# Patient Record
Sex: FEMALE | URBAN - NONMETROPOLITAN AREA
[De-identification: names, ages, dates, MRNs, and addresses within clinical notes are randomized per-mention and may not be internally consistent; named-entity substitution may affect disease eponyms.]

---

## 2022-08-11 ENCOUNTER — APPOINTMENT (RX ONLY)
Dept: URBAN - NONMETROPOLITAN AREA CLINIC 4 | Facility: CLINIC | Age: 82
Setting detail: DERMATOLOGY
End: 2022-08-11

## 2022-08-11 DIAGNOSIS — L60.3 NAIL DYSTROPHY: ICD-10-CM

## 2022-08-11 PROCEDURE — 11730 AVULSION NAIL PLATE SIMPLE 1: CPT

## 2022-08-11 PROCEDURE — ? NAIL CLIPPING FOR PATHOLOGY

## 2022-08-11 PROCEDURE — ? COUNSELING

## 2022-08-11 PROCEDURE — ? NAIL AVULSION

## 2022-08-11 ASSESSMENT — LOCATION SIMPLE DESCRIPTION DERM: LOCATION SIMPLE: RIGHT THUMBNAIL

## 2022-08-11 ASSESSMENT — LOCATION DETAILED DESCRIPTION DERM: LOCATION DETAILED: RIGHT THUMBNAIL

## 2022-08-11 ASSESSMENT — LOCATION ZONE DERM: LOCATION ZONE: FINGERNAIL

## 2022-08-11 NOTE — PROCEDURE: NAIL AVULSION
Bill For Surgical Tray: no
Lab: 6
Consent: The rationale for nail avulsion was explained to the patient and consent was obtained. The risks, benefits and alternatives to therapy were discussed in detail. Specifically, the risks of infection, scarring, bleeding, prolonged wound healing, incomplete removal, allergy to anesthesia, nerve injury and recurrence were addressed. Prior to the procedure, the treatment site was clearly identified and confirmed by the patient. All components of Universal Protocol/PAUSE Rule completed.
Body Location Override (Optional - Billing Will Still Be Based On Selected Body Map Location If Applicable): right thumbnail
Lab Facility: 3
Avulsion Type: nail avulsion
Billing Type: Third-Party Bill
Surgical Prep Text: The patient was placed in the supine position on the operating table in the surgery suite. The area was prepared and draped in the standard manner. Digital block(s) were obtained with 1% lidocaine with epinephrine.
Detail Level: Detailed
Nail Avulsion Text: The nail was removed by undermining, removing the nail from the nail bed. The nail was subsequently avulsed.  Hemostasis was obtained with electrocautery.

## 2022-08-11 NOTE — HPI: INFECTION (PARONYCHIA)
How Severe Is It?: moderate
Is This A New Presentation, Or A Follow-Up?: Infection
Additional History: Patient was taking Amoxicillin 500 mg BID.

## 2022-08-18 ENCOUNTER — APPOINTMENT (RX ONLY)
Dept: URBAN - NONMETROPOLITAN AREA CLINIC 4 | Facility: CLINIC | Age: 82
Setting detail: DERMATOLOGY
End: 2022-08-18

## 2022-08-18 DIAGNOSIS — L60.3 NAIL DYSTROPHY: ICD-10-CM

## 2022-08-18 DIAGNOSIS — D485 NEOPLASM OF UNCERTAIN BEHAVIOR OF SKIN: ICD-10-CM

## 2022-08-18 PROBLEM — D48.5 NEOPLASM OF UNCERTAIN BEHAVIOR OF SKIN: Status: ACTIVE | Noted: 2022-08-18

## 2022-08-18 PROCEDURE — ? COUNSELING

## 2022-08-18 PROCEDURE — 99212 OFFICE O/P EST SF 10 MIN: CPT | Mod: 25

## 2022-08-18 PROCEDURE — 11301 SHAVE SKIN LESION 0.6-1.0 CM: CPT

## 2022-08-18 PROCEDURE — ? TREATMENT REGIMEN

## 2022-08-18 PROCEDURE — ? SHAVE REMOVAL

## 2022-08-18 ASSESSMENT — LOCATION ZONE DERM
LOCATION ZONE: FINGERNAIL
LOCATION ZONE: ARM

## 2022-08-18 ASSESSMENT — LOCATION SIMPLE DESCRIPTION DERM
LOCATION SIMPLE: RIGHT THUMBNAIL
LOCATION SIMPLE: LEFT FOREARM

## 2022-08-18 ASSESSMENT — LOCATION DETAILED DESCRIPTION DERM
LOCATION DETAILED: RIGHT THUMBNAIL
LOCATION DETAILED: LEFT DISTAL DORSAL FOREARM

## 2022-08-18 NOTE — PROCEDURE: SHAVE REMOVAL
Medical Necessity Information: It is in your best interest to select a reason for this procedure from the list below. All of these items fulfill various CMS LCD requirements except the new and changing color options.
Medical Necessity Clause: This procedure was medically necessary because the lesion that was treated was:
Lab: 6
Lab Facility: 3
Detail Level: Detailed
Was A Bandage Applied: Yes
Size Of Lesion In Cm (Required): 0.6
X Size Of Lesion In Cm (Optional): 0
Biopsy Method: Dermablade
Anesthesia Type: 1% lidocaine with epinephrine
Hemostasis: Drysol
Wound Care: Petrolatum
Path Notes (To The Dermatopathologist): Please check margins
Render Path Notes In Note?: No
Consent was obtained from the patient. The risks and benefits to therapy were discussed in detail. Specifically, the risks of infection, scarring, bleeding, prolonged wound healing, incomplete removal, allergy to anesthesia, nerve injury and recurrence were addressed. Prior to the procedure, the treatment site was clearly identified and confirmed by the patient. All components of Universal Protocol/PAUSE Rule completed.
Post-Care Instructions: I reviewed with the patient in detail post-care instructions. Patient is to keep the biopsy site dry overnight, and then apply bacitracin twice daily until healed. Patient may apply hydrogen peroxide soaks to remove any crusting.
Notification Instructions: Patient will be notified of pathology results. However, patient instructed to call the office if not contacted within 2 weeks.
Billing Type: Third-Party Bill

## 2022-08-25 ENCOUNTER — APPOINTMENT (RX ONLY)
Dept: URBAN - NONMETROPOLITAN AREA CLINIC 4 | Facility: CLINIC | Age: 82
Setting detail: DERMATOLOGY
End: 2022-08-25

## 2022-08-25 DIAGNOSIS — T81.3 DISRUPTION OF WOUND, NOT ELSEWHERE CLASSIFIED: ICD-10-CM

## 2022-08-25 DIAGNOSIS — T81.329 DEEP DISRUPTION OR DEHISCENCE OF OPERATION WOUND, UNSPECIFIED: ICD-10-CM

## 2022-08-25 PROBLEM — T81.31XA DISRUPTION OF EXTERNAL OPERATION (SURGICAL) WOUND, NOT ELSEWHERE CLASSIFIED, INITIAL ENCOUNTER: Status: ACTIVE | Noted: 2022-08-25

## 2022-08-25 PROCEDURE — ? TREATMENT REGIMEN

## 2022-08-25 PROCEDURE — ? COUNSELING

## 2022-08-25 PROCEDURE — ? PRESCRIPTION

## 2022-08-25 PROCEDURE — 99213 OFFICE O/P EST LOW 20 MIN: CPT

## 2022-08-25 RX ORDER — AMOXICILLIN 500 MG/1
500 CAPSULE ORAL BID
Qty: 14 | Refills: 0 | Status: ERX | COMMUNITY
Start: 2022-08-25

## 2022-08-25 RX ADMIN — AMOXICILLIN 500: 500 CAPSULE ORAL at 00:00

## 2022-08-25 ASSESSMENT — LOCATION DETAILED DESCRIPTION DERM: LOCATION DETAILED: LEFT PROXIMAL DORSAL FOREARM

## 2022-08-25 ASSESSMENT — LOCATION SIMPLE DESCRIPTION DERM: LOCATION SIMPLE: LEFT FOREARM

## 2022-08-25 ASSESSMENT — LOCATION ZONE DERM: LOCATION ZONE: ARM

## 2022-08-25 NOTE — PROCEDURE: TREATMENT REGIMEN
Initiate Treatment: Amoxicillin 500mg BID x 7 days
Detail Level: Zone
Continue Regimen: Patient will not pick or scrub or irritate her surgical site.
Samples Given: Ala-soila BID

## 2023-02-23 ENCOUNTER — APPOINTMENT (RX ONLY)
Dept: URBAN - NONMETROPOLITAN AREA CLINIC 4 | Facility: CLINIC | Age: 83
Setting detail: DERMATOLOGY
End: 2023-02-23

## 2023-02-23 DIAGNOSIS — L60.3 NAIL DYSTROPHY: ICD-10-CM

## 2023-02-23 PROCEDURE — ? COUNSELING

## 2023-02-23 PROCEDURE — ? NAIL AVULSION

## 2023-02-23 PROCEDURE — 11730 AVULSION NAIL PLATE SIMPLE 1: CPT

## 2023-02-23 ASSESSMENT — LOCATION ZONE DERM: LOCATION ZONE: FINGERNAIL

## 2023-02-23 ASSESSMENT — LOCATION SIMPLE DESCRIPTION DERM: LOCATION SIMPLE: LEFT THUMBNAIL

## 2023-02-23 ASSESSMENT — LOCATION DETAILED DESCRIPTION DERM: LOCATION DETAILED: LEFT THUMBNAIL

## 2023-02-23 NOTE — PROCEDURE: NAIL AVULSION
Surgical Prep Text: The patient was placed in the supine position on the operating table in the surgery suite. The area was prepared and draped in the standard manner. Digital block(s) were obtained with 2% lidocaine without epinephrine.
Bill For Surgical Tray: no
Nail Avulsion Text: The nail was removed by undermining, removing the nail from the nail bed. The nail was subsequently avulsed.  Hemostasis was obtained with electrocautery.
Billing Type: Third-Party Bill
Partial Avulsion Text: The nail was partially removed by undermining, removing the nail from the nail bed. The nail was subsequently avulsed.  Hemostasis was obtained with electrocautery.
Lab Facility: 3
Consent: The rationale for nail avulsion was explained to the patient and consent was obtained. The risks, benefits and alternatives to therapy were discussed in detail. Specifically, the risks of infection, scarring, bleeding, prolonged wound healing, incomplete removal, allergy to anesthesia, nerve injury and recurrence were addressed. Prior to the procedure, the treatment site was clearly identified and confirmed by the patient. All components of Universal Protocol/PAUSE Rule completed.
Detail Level: Detailed
Avulsion Type: nail avulsion
Lab: 6
Nail And Matrix Avulsion Text: The nail was removed by undermining, removing the nail from the nail bed. The nail was subsequently avulsed.  The matrix was then removed with a 15 blade scalpel. Hemostasis was obtained with electrocautery.

## 2023-04-25 ENCOUNTER — TELEPHONE (OUTPATIENT)
Dept: UROLOGY | Facility: AMBULATORY SURGERY CENTER | Age: 83
End: 2023-04-25

## 2023-04-25 NOTE — TELEPHONE ENCOUNTER
New Patient    What is the reason for the patient’s appointment?: establish care  Pt was seen in [place]  She had two botox treatments  She states that she has leakage       What office location does the patient prefer?: [facility] [place]     Does patient have Imaging/Lab Results: no    Have patient records been requested?: yes   If No, are the records showing in Epic:       INSURANCE:  Do we accept the patient's insurance or is the patient Self-Pay?: yes    Insurance Provider: medicare   Plan Type/Number:  Member ID#:       HISTORY:   Has the patient had any previous Urologist(s)?: yes Christine Dudley    Was the patient seen in the ED?: no    Has the patient had any outside testing done?: yes     Does the patient have a personal history of cancer?: no

## 2023-05-05 ENCOUNTER — OFFICE VISIT (OUTPATIENT)
Dept: UROLOGY | Facility: CLINIC | Age: 83
End: 2023-05-05

## 2023-05-05 VITALS
HEIGHT: 62 IN | WEIGHT: 180 LBS | BODY MASS INDEX: 33.13 KG/M2 | SYSTOLIC BLOOD PRESSURE: 130 MMHG | DIASTOLIC BLOOD PRESSURE: 80 MMHG

## 2023-05-05 DIAGNOSIS — R32 URINARY INCONTINENCE, UNSPECIFIED TYPE: Primary | ICD-10-CM

## 2023-05-05 LAB

## 2023-05-05 RX ORDER — LISINOPRIL 40 MG/1
1 TABLET ORAL DAILY
COMMUNITY
Start: 2023-02-16

## 2023-05-05 RX ORDER — OMEPRAZOLE 20 MG/1
20 CAPSULE, DELAYED RELEASE ORAL DAILY
COMMUNITY
Start: 2023-02-20

## 2023-05-05 RX ORDER — ESCITALOPRAM OXALATE 5 MG/1
5 TABLET ORAL DAILY
COMMUNITY
Start: 2023-04-05

## 2023-05-05 RX ORDER — PRAMIPEXOLE DIHYDROCHLORIDE 0.25 MG/1
TABLET ORAL
COMMUNITY
Start: 2023-04-10

## 2023-05-05 RX ORDER — VIBEGRON 75 MG/1
1 TABLET, FILM COATED ORAL DAILY
COMMUNITY
Start: 2023-03-31 | End: 2023-05-05 | Stop reason: ALTCHOICE
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
## 2023-05-17 ENCOUNTER — PROCEDURE VISIT (OUTPATIENT)
Dept: UROLOGY | Facility: CLINIC | Age: 83
End: 2023-05-17

## 2023-05-17 VITALS
WEIGHT: 180 LBS | HEART RATE: 82 BPM | DIASTOLIC BLOOD PRESSURE: 70 MMHG | OXYGEN SATURATION: 87 % | HEIGHT: 62 IN | SYSTOLIC BLOOD PRESSURE: 130 MMHG | BODY MASS INDEX: 33.13 KG/M2

## 2023-05-17 DIAGNOSIS — N30.80 CYSTITIS CYSTICA: ICD-10-CM

## 2023-05-17 DIAGNOSIS — N30.00 ACUTE CYSTITIS WITHOUT HEMATURIA: ICD-10-CM

## 2023-05-17 DIAGNOSIS — R32 URINARY INCONTINENCE, UNSPECIFIED TYPE: Primary | ICD-10-CM

## 2023-05-17 DIAGNOSIS — N32.81 OVERACTIVE BLADDER: ICD-10-CM

## 2023-05-17 LAB
SL AMB  POCT GLUCOSE, UA: NORMAL
SL AMB LEUKOCYTE ESTERASE,UA: NORMAL
SL AMB POCT BILIRUBIN,UA: NORMAL
SL AMB POCT BLOOD,UA: NORMAL
SL AMB POCT CLARITY,UA: CLEAR
SL AMB POCT COLOR,UA: YELLOW
SL AMB POCT KETONES,UA: NORMAL
SL AMB POCT NITRITE,UA: NORMAL
SL AMB POCT PH,UA: 7
SL AMB POCT SPECIFIC GRAVITY,UA: 1
SL AMB POCT URINE PROTEIN: NORMAL
SL AMB POCT UROBILINOGEN: 0.2

## 2023-05-17 RX ORDER — CEPHALEXIN 500 MG/1
500 CAPSULE ORAL EVERY 6 HOURS SCHEDULED
Qty: 28 CAPSULE | Refills: 0 | Status: SHIPPED | OUTPATIENT
Start: 2023-05-17 | End: 2023-05-24

## 2023-05-17 RX ORDER — CEPHALEXIN 500 MG/1
500 CAPSULE ORAL ONCE
Qty: 1 CAPSULE | Refills: 0 | Status: SHIPPED | OUTPATIENT
Start: 2023-05-17 | End: 2023-05-17

## 2023-05-17 NOTE — LETTER
May 17, 2023     Mark Santiago MD  [address] [address]    Patient: Magy Harden   YOB: 1940   Date of Visit: 2023       Dear Dr Franklyn Amin: Thank you for referring Porter Rice to me for evaluation  Below are my notes for this consultation  If you have questions, please do not hesitate to call me  I look forward to following your patient along with you  Sincerely,        Saranya Funes MD        CC: No Recipients  Saranya Funes MD  2023  2:14 PM  Sign when Signing Visit  [address] [facility]  [address], [address] [place] [phone]  www  [email or web address]      NAME: Lisa Wolfe  AGE: 78 y o  SEX: female  : 1940   MRN: [id number]    DATE: 2023  TIME: 2:10 PM    Assessment and Plan:    Refractory urge incontinence and overactive bladder, spondee to Botox but failed medications  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 and she may have an active UTI  Urine culture was sent and I will give her a full course of antibiotics to see if we can clear this up-this will be Keflex 500 mg p o  4 times daily for 7 days  It is possible that her symptoms may resolve with clearing of the UTI  If she still has a symptoms after the urinary tract infection is cleared, we will set her up for Botox upon her request                Chief Complaint   No chief complaint on file  History of Present Illness   56-year-old woman, established patient but new to me-seen as a new patient by Mr Александр Han May 5, 2023 for overactive bladder with urge incontinence  She has failed multiple oral therapies to include Sanctura, Ditropan, Myrbetriq and more recently [place]  She had been undergoing Botox injections with the last injection 2022 by Dr Stef Farrar with [facility]  She was set up for cystoscopy for evaluation and consideration for additional Botox therapy    She has history of right renal cell carcinoma with history of right nephrectomy  No recent upper tract imaging  The Botox was working for her, and the last injection was perhaps August or September of last year  Cystoscopy     Date/Time 5/17/2023 1:30 PM     Performed by  Yasmany Medrano MD     Authorized by BRITTANY Begum      Universal Protocol:  Consent: Verbal consent obtained  Written consent obtained  Procedure Details:  Procedure type: cystoscopy    Additional Procedure Details: Cystoscopy Procedure Note        Pre-operative Diagnosis: Refractory overactive bladder    Post-operative Diagnosis: Same, cystitis cystica with possible active UTI    Procedure: Flexible cystoscopy    Surgeon: Edson Goodell MD    Anesthesia: 1% Xylocaine per urethra    EBL: Minimal    Complications: none    Procedure Details   The risks, benefits, complications, treatment options, and expected outcomes were discussed with the patient  The patient concurred with the proposed plan, giving informed consent  Cystoscopy was performed today under local anesthesia, using sterile technique  The patient was placed in the supine position, prepped with Betadine, and draped in the usual sterile fashion  The flexible cystocope was used to inspect both the urethra and bladder    Findings:  Urethra: Normal without stricture  Introitus normal for age urethra somewhat retracted  Bladder: Trabeculation, cloudy urine and cystitis cystica     The orifices were orthotopic and intact  60 cc of urine was removed with a syringe and sent for culture  Specimens: Urine culture                 Complications:  None           Disposition: To home            Condition:  Stable          The following portions of the patient's history were reviewed and updated as appropriate: allergies, current medications, past family history, past medical history, past social history, past surgical history and problem list   History reviewed   No "pertinent past medical history  History reviewed  No pertinent surgical history  shoulder  Review of Systems   Review of Systems   Constitutional: Negative for fever  Respiratory: Negative for shortness of breath  Cardiovascular: Negative for chest pain  Genitourinary: Positive for frequency  As per HPI       Active Problem List   There is no problem list on file for this patient  Objective   /70   Pulse 82   Ht 5' 2\" (1 575 m)   Wt 81 6 kg (180 lb)   SpO2 (!) 87%   BMI 32 92 kg/m²     Physical Exam  Vitals reviewed  Constitutional:       Appearance: Normal appearance  HENT:      Head: Normocephalic and atraumatic  Eyes:      Extraocular Movements: Extraocular movements intact  Pulmonary:      Effort: Pulmonary effort is normal    Genitourinary:     General: Normal vulva  Vagina: No vaginal discharge  Musculoskeletal:         General: Normal range of motion  Cervical back: Normal range of motion  Skin:     Coloration: Skin is not jaundiced or pale  Neurological:      General: No focal deficit present  Mental Status: She is alert and oriented to person, place, and time  Psychiatric:         Mood and Affect: Mood normal          Behavior: Behavior normal          Thought Content:  Thought content normal          Judgment: Judgment normal              Current Medications     Current Outpatient Medications:   •  cephalexin (KEFLEX) 500 mg capsule, Take 1 capsule (500 mg total) by mouth 1 (one) time for 1 dose Take after procedure, Disp: 1 capsule, Rfl: 0  •  escitalopram (LEXAPRO) 5 mg tablet, Take 5 mg by mouth daily, Disp: , Rfl:   •  lisinopril (ZESTRIL) 40 mg tablet, Take 1 tablet by mouth daily, Disp: , Rfl:   •  omeprazole (PriLOSEC) 20 mg delayed release capsule, Take 20 mg by mouth daily, Disp: , Rfl:   •  pramipexole (MIRAPEX) 0 25 mg tablet, Take 1 tablet (0 25 mg total) by mouth nightly , Disp: , Rfl:         Jules Elizabeth MD          "

## 2023-05-17 NOTE — PROGRESS NOTES
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 and she may have an active UTI  Urine culture was sent and I will give her a full course of antibiotics to see if we can clear this up-this will be Keflex 500 mg p o  4 times daily for 7 days  It is possible that her symptoms may resolve with clearing of the UTI  If she still has a symptoms after the urinary tract infection is cleared, we will set her up for Botox upon her request                Chief Complaint   No chief complaint on file  History of Present Illness   26-year-old woman, established patient but new to me-seen as a new patient by Mr Chapito Wasserman May 5, 2023 for overactive bladder with urge incontinence  She has failed multiple oral therapies to include Sanctura, Ditropan, Myrbetriq and more recently [place]  She had been undergoing Botox injections with the last injection 2022 by Dr Hilda Carrasco with [facility]  She was set up for cystoscopy for evaluation and consideration for additional Botox therapy  She has history of right renal cell carcinoma with history of right nephrectomy  No recent upper tract imaging  The Botox was working for her, and the last injection was perhaps August or September of last year  Cystoscopy     Date/Time 2023 1:30 PM     Performed by  Sherwin Cook MD     Authorized by BRITTANY Quesada      Universal Protocol:  Consent: Verbal consent obtained  Written consent obtained        Procedure Details:  Procedure type: cystoscopy    Additional Procedure Details: Cystoscopy Procedure "Note        Pre-operative Diagnosis: Refractory overactive bladder    Post-operative Diagnosis: Same, cystitis cystica with possible active UTI    Procedure: Flexible cystoscopy    Surgeon: Maggie Rizzo MD    Anesthesia: 1% Xylocaine per urethra    EBL: Minimal    Complications: none    Procedure Details   The risks, benefits, complications, treatment options, and expected outcomes were discussed with the patient  The patient concurred with the proposed plan, giving informed consent  Cystoscopy was performed today under local anesthesia, using sterile technique  The patient was placed in the supine position, prepped with Betadine, and draped in the usual sterile fashion  The flexible cystocope was used to inspect both the urethra and bladder    Findings:  Urethra: Normal without stricture  Introitus normal for age urethra somewhat retracted  Bladder: Trabeculation, cloudy urine and cystitis cystica     The orifices were orthotopic and intact  60 cc of urine was removed with a syringe and sent for culture  Specimens: Urine culture                 Complications:  None           Disposition: To home            Condition:  Stable          The following portions of the patient's history were reviewed and updated as appropriate: allergies, current medications, past family history, past medical history, past social history, past surgical history and problem list   History reviewed  No pertinent past medical history  History reviewed  No pertinent surgical history  shoulder  Review of Systems   Review of Systems   Constitutional: Negative for fever  Respiratory: Negative for shortness of breath  Cardiovascular: Negative for chest pain  Genitourinary: Positive for frequency  As per HPI       Active Problem List   There is no problem list on file for this patient        Objective   /70   Pulse 82   Ht 5' 2\" (1 575 m)   Wt 81 6 kg (180 lb)   SpO2 (!) 87%   BMI 32 92 kg/m² " Physical Exam  Vitals reviewed  Constitutional:       Appearance: Normal appearance  HENT:      Head: Normocephalic and atraumatic  Eyes:      Extraocular Movements: Extraocular movements intact  Pulmonary:      Effort: Pulmonary effort is normal    Genitourinary:     General: Normal vulva  Vagina: No vaginal discharge  Musculoskeletal:         General: Normal range of motion  Cervical back: Normal range of motion  Skin:     Coloration: Skin is not jaundiced or pale  Neurological:      General: No focal deficit present  Mental Status: She is alert and oriented to person, place, and time  Psychiatric:         Mood and Affect: Mood normal          Behavior: Behavior normal          Thought Content:  Thought content normal          Judgment: Judgment normal              Current Medications     Current Outpatient Medications:   •  cephalexin (KEFLEX) 500 mg capsule, Take 1 capsule (500 mg total) by mouth 1 (one) time for 1 dose Take after procedure, Disp: 1 capsule, Rfl: 0  •  escitalopram (LEXAPRO) 5 mg tablet, Take 5 mg by mouth daily, Disp: , Rfl:   •  lisinopril (ZESTRIL) 40 mg tablet, Take 1 tablet by mouth daily, Disp: , Rfl:   •  omeprazole (PriLOSEC) 20 mg delayed release capsule, Take 20 mg by mouth daily, Disp: , Rfl:   •  pramipexole (MIRAPEX) 0 25 mg tablet, Take 1 tablet (0 25 mg total) by mouth nightly , Disp: , Rfl:         Yasmany Medrano MD

## 2023-05-17 NOTE — PATIENT INSTRUCTIONS
You have just undergone cystoscopy of the bladder  Expect to see some blood in the urine, which should clear up within 24 to 48 hours  You also may experience burning urgency and frequency of urination which is normal   Call for fever greater than 101 5 degrees, severe pain not relieved by over-the-counter medicines, or inability to urinate  You may resume all normal activities  For irritative symptoms, you can purchase over-the-counter remedies such as cystek or Azo, or any other preparations advertised in the pharmacy for bladder symptoms  I will call in Pyridium which is a prescription strength medication for bladder irritability upon your request   I sent in a full prescription for Keflex to be taken for 7 days  Urine culture was sent and we will see what this shows  If Keflex is not the right antibiotic we will send in another prescription    Let me know if you are no better after being treated for urinary tract infection and we will set you up for Botox upon your request

## 2023-05-18 ENCOUNTER — APPOINTMENT (RX ONLY)
Dept: URBAN - NONMETROPOLITAN AREA CLINIC 4 | Facility: CLINIC | Age: 83
Setting detail: DERMATOLOGY
End: 2023-05-18

## 2023-05-18 DIAGNOSIS — B35.1 TINEA UNGUIUM: ICD-10-CM | Status: WELL CONTROLLED

## 2023-05-18 PROCEDURE — ? TREATMENT REGIMEN

## 2023-05-18 PROCEDURE — ? COUNSELING

## 2023-05-18 PROCEDURE — ? PHOTO-DOCUMENTATION

## 2023-05-18 PROCEDURE — ? PRESCRIPTION

## 2023-05-18 PROCEDURE — 99213 OFFICE O/P EST LOW 20 MIN: CPT

## 2023-05-18 RX ORDER — CLOTRIMAZOLE AND BETAMETHASONE DIPROPIONATE 10; .5 MG/G; MG/G
1-0.05% CREAM TOPICAL QD
Qty: 1 | Refills: 3 | Status: ERX | COMMUNITY
Start: 2023-05-18

## 2023-05-18 RX ADMIN — CLOTRIMAZOLE AND BETAMETHASONE DIPROPIONATE 1-0.05%: 10; .5 CREAM TOPICAL at 00:00

## 2023-05-18 ASSESSMENT — LOCATION SIMPLE DESCRIPTION DERM
LOCATION SIMPLE: RIGHT THUMBNAIL
LOCATION SIMPLE: RIGHT THUMB

## 2023-05-18 ASSESSMENT — LOCATION DETAILED DESCRIPTION DERM
LOCATION DETAILED: PERIUNGUAL SKIN RIGHT THUMB
LOCATION DETAILED: RIGHT THUMBNAIL

## 2023-05-18 ASSESSMENT — LOCATION ZONE DERM
LOCATION ZONE: FINGERNAIL
LOCATION ZONE: FINGER

## 2023-05-19 LAB — BACTERIA UR CULT: ABNORMAL

## 2023-06-02 ENCOUNTER — TELEPHONE (OUTPATIENT)
Dept: OTHER | Facility: OTHER | Age: 83
End: 2023-06-02

## 2023-06-02 NOTE — TELEPHONE ENCOUNTER
Called and spoke with Lory Owen  Advised that Keflex was the appropriate antibiotic for her most recent urinary tract infection and she did complete the full course  Patient continues with OAB and urinary frequency as before  She would like to proceed with Botox as discussed with Dr Berhane Brown  Advised message would be sent to the provider to place case request, then patient should expect a call from Robin Rizzo 148 7-10 business days after case request is placed to schedule procedure  She verbalized understanding

## 2023-06-02 NOTE — TELEPHONE ENCOUNTER
Pt calling stating she was expecting a call from Dr Rodolfo Meyers this week regarding urine culture results  She is wondering whether or not she is going to need botox  Please follow up with her regarding this

## 2023-06-05 ENCOUNTER — PREP FOR PROCEDURE (OUTPATIENT)
Dept: UROLOGY | Facility: CLINIC | Age: 83
End: 2023-06-05

## 2023-06-05 DIAGNOSIS — Z01.818 ENCOUNTER FOR PREADMISSION TESTING: ICD-10-CM

## 2023-06-05 DIAGNOSIS — R39.89 SUSPECTED UTI: Primary | ICD-10-CM

## 2023-06-05 DIAGNOSIS — N39.498 OTHER URINARY INCONTINENCE: ICD-10-CM

## 2023-06-05 DIAGNOSIS — N32.81 OVERACTIVE BLADDER: ICD-10-CM

## 2023-06-09 ENCOUNTER — TELEPHONE (OUTPATIENT)
Dept: UROLOGY | Facility: CLINIC | Age: 83
End: 2023-06-09

## 2023-06-09 NOTE — TELEPHONE ENCOUNTER
Spoke to pt, scheduled Botox for next available at [facility] for 7/26   Mailed all instructions
Launch [employer] and print the 'Prescriptions from this Visit' Report

## 2023-07-21 RX ORDER — DIPHENOXYLATE HYDROCHLORIDE AND ATROPINE SULFATE 2.5; .025 MG/1; MG/1
1 TABLET ORAL DAILY
COMMUNITY

## 2023-07-21 RX ORDER — CLOTRIMAZOLE AND BETAMETHASONE DIPROPIONATE 10; .64 MG/G; MG/G
CREAM TOPICAL
COMMUNITY
Start: 2023-05-18

## 2023-07-21 NOTE — PRE-PROCEDURE INSTRUCTIONS
Pre-Surgery Instructions:   Medication Instructions   • Ascorbic Acid (VITAMIN C PO) Stop taking 4 days prior to surgery. • clotrimazole-betamethasone (LOTRISONE) 1-0.05 % cream Hold day of surgery. • escitalopram (LEXAPRO) 5 mg tablet Take day of surgery. • lisinopril (ZESTRIL) 40 mg tablet Hold day of surgery. • multivitamin (THERAGRAN) TABS Stop taking 4 days prior to surgery. • omeprazole (PriLOSEC) 20 mg delayed release capsule Hold day of surgery. • pramipexole (MIRAPEX) 0.25 mg tablet Take night before surgery   Medication instructions for day surgery reviewed. Please use only a sip of water to take your instructed medications. Avoid all over the counter vitamins, supplements and NSAIDS for one week prior to surgery per anesthesia guidelines. Tylenol is ok to take as needed. You will receive a call one business day prior to surgery with an arrival time and hospital directions. If your surgery is scheduled on a Monday, the hospital will be calling you on the Friday prior to your surgery. If you have not heard from anyone by 8pm, please call the hospital supervisor through the hospital  at [phone]. Olman Jesús [phone]). Do not eat or drink anything after midnight the night before your surgery, including candy, mints, lifesavers, or chewing gum. Do not drink alcohol 24hrs before your surgery. Try not to smoke at least 24hrs before your surgery. Follow the pre surgery showering instructions as listed in the [facility] Surgical Experience Booklet” or otherwise provided by your surgeon's office. Do not shave the surgical area 24 hours before surgery. Do not apply any lotions, creams, including makeup, cologne, deodorant, or perfumes after showering on the day of your surgery. No contact lenses, eye make-up, or artificial eyelashes. Remove nail polish, including gel polish, and any artificial, gel, or acrylic nails if possible.  Remove all jewelry including rings and body piercing jewelry. Wear causal clothing that is easy to take on and off. Consider your type of surgery. Keep any valuables, jewelry, piercings at home. Please bring any specially ordered equipment (sling, braces) if indicated. Arrange for a responsible person to drive you to and from the hospital on the day of your surgery. Visitor Guidelines discussed. Call the surgeon's office with any new illnesses, exposures, or additional questions prior to surgery. Please reference your [facility] Surgical Experience Booklet” for additional information to prepare for your upcoming surgery.

## 2023-07-24 ENCOUNTER — OFFICE VISIT (OUTPATIENT)
Dept: LAB | Facility: HOSPITAL | Age: 83
End: 2023-07-24
Payer: MEDICARE

## 2023-07-24 ENCOUNTER — APPOINTMENT (OUTPATIENT)
Dept: LAB | Facility: HOSPITAL | Age: 83
End: 2023-07-24
Payer: MEDICARE

## 2023-07-24 DIAGNOSIS — Z01.818 ENCOUNTER FOR PREADMISSION TESTING: ICD-10-CM

## 2023-07-24 DIAGNOSIS — R39.89 SUSPECTED UTI: ICD-10-CM

## 2023-07-24 LAB
ANION GAP SERPL CALCULATED.3IONS-SCNC: 8 MMOL/L
BASOPHILS # BLD AUTO: 0.07 THOUSANDS/ÂΜL (ref 0–0.1)
BASOPHILS NFR BLD AUTO: 1 % (ref 0–1)
BUN SERPL-MCNC: 17 MG/DL (ref 5–25)
CALCIUM SERPL-MCNC: 9.6 MG/DL (ref 8.4–10.2)
CHLORIDE SERPL-SCNC: 102 MMOL/L (ref 96–108)
CO2 SERPL-SCNC: 26 MMOL/L (ref 21–32)
CREAT SERPL-MCNC: 0.73 MG/DL (ref 0.6–1.3)
EOSINOPHIL # BLD AUTO: 0.21 THOUSAND/ÂΜL (ref 0–0.61)
EOSINOPHIL NFR BLD AUTO: 3 % (ref 0–6)
ERYTHROCYTE [DISTWIDTH] IN BLOOD BY AUTOMATED COUNT: 13.2 % (ref 11.6–15.1)
GFR SERPL CREATININE-BSD FRML MDRD: 75 ML/MIN/1.73SQ M
GLUCOSE SERPL-MCNC: 115 MG/DL (ref 65–140)
HCT VFR BLD AUTO: 48 % (ref 34.8–46.1)
HGB BLD-MCNC: 15.1 G/DL (ref 11.5–15.4)
IMM GRANULOCYTES # BLD AUTO: 0.01 THOUSAND/UL (ref 0–0.2)
IMM GRANULOCYTES NFR BLD AUTO: 0 % (ref 0–2)
LYMPHOCYTES # BLD AUTO: 2.07 THOUSANDS/ÂΜL (ref 0.6–4.47)
LYMPHOCYTES NFR BLD AUTO: 26 % (ref 14–44)
MCH RBC QN AUTO: 31.2 PG (ref 26.8–34.3)
MCHC RBC AUTO-ENTMCNC: 31.5 G/DL (ref 31.4–37.4)
MCV RBC AUTO: 99 FL (ref 82–98)
MONOCYTES # BLD AUTO: 0.47 THOUSAND/ÂΜL (ref 0.17–1.22)
MONOCYTES NFR BLD AUTO: 6 % (ref 4–12)
NEUTROPHILS # BLD AUTO: 5.03 THOUSANDS/ÂΜL (ref 1.85–7.62)
NEUTS SEG NFR BLD AUTO: 64 % (ref 43–75)
NRBC BLD AUTO-RTO: 0 /100 WBCS
PLATELET # BLD AUTO: 201 THOUSANDS/UL (ref 149–390)
PMV BLD AUTO: 11.2 FL (ref 8.9–12.7)
POTASSIUM SERPL-SCNC: 4.8 MMOL/L (ref 3.5–5.3)
RBC # BLD AUTO: 4.84 MILLION/UL (ref 3.81–5.12)
SODIUM SERPL-SCNC: 136 MMOL/L (ref 135–147)
WBC # BLD AUTO: 7.86 THOUSAND/UL (ref 4.31–10.16)

## 2023-07-24 PROCEDURE — 85025 COMPLETE CBC W/AUTO DIFF WBC: CPT

## 2023-07-24 PROCEDURE — 87086 URINE CULTURE/COLONY COUNT: CPT

## 2023-07-24 PROCEDURE — 80048 BASIC METABOLIC PNL TOTAL CA: CPT

## 2023-07-24 PROCEDURE — 36415 COLL VENOUS BLD VENIPUNCTURE: CPT

## 2023-07-24 PROCEDURE — 93005 ELECTROCARDIOGRAM TRACING: CPT

## 2023-07-25 LAB
ATRIAL RATE: 67 BPM
BACTERIA UR CULT: NORMAL
P AXIS: 73 DEGREES
PR INTERVAL: 188 MS
QRS AXIS: 69 DEGREES
QRSD INTERVAL: 82 MS
QT INTERVAL: 426 MS
QTC INTERVAL: 450 MS
T WAVE AXIS: 58 DEGREES
VENTRICULAR RATE: 67 BPM

## 2023-07-25 PROCEDURE — 93010 ELECTROCARDIOGRAM REPORT: CPT | Performed by: INTERNAL MEDICINE

## 2023-07-26 ENCOUNTER — ANESTHESIA EVENT (OUTPATIENT)
Dept: PERIOP | Facility: HOSPITAL | Age: 83
End: 2023-07-26
Payer: MEDICARE

## 2023-07-26 ENCOUNTER — HOSPITAL ENCOUNTER (OUTPATIENT)
Facility: HOSPITAL | Age: 83
Setting detail: OUTPATIENT SURGERY
Discharge: HOME | End: 2023-07-26
Attending: UROLOGY | Admitting: UROLOGY
Payer: MEDICARE

## 2023-07-26 ENCOUNTER — ANESTHESIA (OUTPATIENT)
Dept: PERIOP | Facility: HOSPITAL | Age: 83
End: 2023-07-26
Payer: MEDICARE

## 2023-07-26 VITALS
TEMPERATURE: 97 F | WEIGHT: 180 LBS | BODY MASS INDEX: 33.13 KG/M2 | HEART RATE: 76 BPM | SYSTOLIC BLOOD PRESSURE: 182 MMHG | OXYGEN SATURATION: 94 % | RESPIRATION RATE: 20 BRPM | HEIGHT: 62 IN | DIASTOLIC BLOOD PRESSURE: 73 MMHG

## 2023-07-26 PROBLEM — K21.9 GASTROESOPHAGEAL REFLUX DISEASE: Status: ACTIVE | Noted: 2023-07-26

## 2023-07-26 PROBLEM — I10 HTN (HYPERTENSION): Status: ACTIVE | Noted: 2023-07-26

## 2023-07-26 PROBLEM — I34.0 MITRAL REGURGITATION: Status: ACTIVE | Noted: 2023-07-26

## 2023-07-26 PROCEDURE — 52287 CYSTOSCOPY CHEMODENERVATION: CPT | Performed by: UROLOGY

## 2023-07-26 PROCEDURE — NC001 PR NO CHARGE: Performed by: UROLOGY

## 2023-07-26 RX ORDER — PROPOFOL 10 MG/ML
INJECTION, EMULSION INTRAVENOUS AS NEEDED
Status: DISCONTINUED | OUTPATIENT
Start: 2023-07-26 | End: 2023-07-26

## 2023-07-26 RX ORDER — PROPOFOL 10 MG/ML
INJECTION, EMULSION INTRAVENOUS CONTINUOUS PRN
Status: DISCONTINUED | OUTPATIENT
Start: 2023-07-26 | End: 2023-07-26

## 2023-07-26 RX ORDER — PHENAZOPYRIDINE HYDROCHLORIDE 100 MG/1
200 TABLET, FILM COATED ORAL ONCE
Status: COMPLETED | OUTPATIENT
Start: 2023-07-26 | End: 2023-07-26

## 2023-07-26 RX ORDER — HYDROMORPHONE HCL/PF 1 MG/ML
0.4 SYRINGE (ML) INJECTION
Status: DISCONTINUED | OUTPATIENT
Start: 2023-07-26 | End: 2023-07-26 | Stop reason: HOSPADM

## 2023-07-26 RX ORDER — MAGNESIUM HYDROXIDE 1200 MG/15ML
LIQUID ORAL AS NEEDED
Status: DISCONTINUED | OUTPATIENT
Start: 2023-07-26 | End: 2023-07-26 | Stop reason: HOSPADM

## 2023-07-26 RX ORDER — FENTANYL CITRATE/PF 50 MCG/ML
50 SYRINGE (ML) INJECTION
Status: DISCONTINUED | OUTPATIENT
Start: 2023-07-26 | End: 2023-07-26 | Stop reason: HOSPADM

## 2023-07-26 RX ORDER — FENTANYL CITRATE 50 UG/ML
INJECTION, SOLUTION INTRAMUSCULAR; INTRAVENOUS AS NEEDED
Status: DISCONTINUED | OUTPATIENT
Start: 2023-07-26 | End: 2023-07-26

## 2023-07-26 RX ORDER — SODIUM CHLORIDE, SODIUM LACTATE, POTASSIUM CHLORIDE, CALCIUM CHLORIDE 600; 310; 30; 20 MG/100ML; MG/100ML; MG/100ML; MG/100ML
125 INJECTION, SOLUTION INTRAVENOUS CONTINUOUS
Status: DISCONTINUED | OUTPATIENT
Start: 2023-07-26 | End: 2023-07-26 | Stop reason: HOSPADM

## 2023-07-26 RX ORDER — ONDANSETRON 2 MG/ML
INJECTION INTRAMUSCULAR; INTRAVENOUS AS NEEDED
Status: DISCONTINUED | OUTPATIENT
Start: 2023-07-26 | End: 2023-07-26

## 2023-07-26 RX ORDER — LIDOCAINE HYDROCHLORIDE 10 MG/ML
INJECTION, SOLUTION EPIDURAL; INFILTRATION; INTRACAUDAL; PERINEURAL AS NEEDED
Status: DISCONTINUED | OUTPATIENT
Start: 2023-07-26 | End: 2023-07-26

## 2023-07-26 RX ORDER — CEFAZOLIN SODIUM 2 G/50ML
2000 SOLUTION INTRAVENOUS ONCE
Status: COMPLETED | OUTPATIENT
Start: 2023-07-26 | End: 2023-07-26

## 2023-07-26 RX ORDER — ONDANSETRON 2 MG/ML
4 INJECTION INTRAMUSCULAR; INTRAVENOUS ONCE AS NEEDED
Status: DISCONTINUED | OUTPATIENT
Start: 2023-07-26 | End: 2023-07-26 | Stop reason: HOSPADM

## 2023-07-26 RX ORDER — PROMETHAZINE HYDROCHLORIDE 25 MG/ML
25 INJECTION, SOLUTION INTRAMUSCULAR; INTRAVENOUS ONCE AS NEEDED
Status: DISCONTINUED | OUTPATIENT
Start: 2023-07-26 | End: 2023-07-26 | Stop reason: HOSPADM

## 2023-07-26 RX ADMIN — PROPOFOL 20 MG: 10 INJECTION, EMULSION INTRAVENOUS at 10:52

## 2023-07-26 RX ADMIN — PROPOFOL 20 MG: 10 INJECTION, EMULSION INTRAVENOUS at 11:00

## 2023-07-26 RX ADMIN — ONDANSETRON 4 MG: 2 INJECTION INTRAMUSCULAR; INTRAVENOUS at 11:03

## 2023-07-26 RX ADMIN — LIDOCAINE HYDROCHLORIDE 50 MG: 10 INJECTION, SOLUTION EPIDURAL; INFILTRATION; INTRACAUDAL; PERINEURAL at 10:49

## 2023-07-26 RX ADMIN — FENTANYL CITRATE 25 MCG: 50 INJECTION, SOLUTION INTRAMUSCULAR; INTRAVENOUS at 10:53

## 2023-07-26 RX ADMIN — PROPOFOL 20 MG: 10 INJECTION, EMULSION INTRAVENOUS at 10:49

## 2023-07-26 RX ADMIN — CEFAZOLIN SODIUM 2000 MG: 2 SOLUTION INTRAVENOUS at 10:45

## 2023-07-26 RX ADMIN — PROPOFOL 60 MCG/KG/MIN: 10 INJECTION, EMULSION INTRAVENOUS at 10:49

## 2023-07-26 RX ADMIN — FENTANYL CITRATE 25 MCG: 50 INJECTION, SOLUTION INTRAMUSCULAR; INTRAVENOUS at 11:00

## 2023-07-26 RX ADMIN — SODIUM CHLORIDE, SODIUM LACTATE, POTASSIUM CHLORIDE, AND CALCIUM CHLORIDE: .6; .31; .03; .02 INJECTION, SOLUTION INTRAVENOUS at 10:14

## 2023-07-26 RX ADMIN — PHENAZOPYRIDINE 200 MG: 100 TABLET ORAL at 11:31

## 2023-07-26 NOTE — ANESTHESIA POSTPROCEDURE EVALUATION
Post-Op Assessment Note    CV Status:  Stable  Pain Score: 0    Pain management: adequate  Multimodal analgesia used between 6 hours prior to anesthesia start to PACU discharge    Mental Status:  Alert and awake   Hydration Status:  Euvolemic   PONV Controlled:  Controlled   Airway Patency:  Patent      Post Op Vitals Reviewed: Yes      Staff: Anesthesiologist, CRNA         No notable events documented.     /67 (07/26/23 1117)    Temp (!) 97 °F (36.1 °C) (07/26/23 1117)    Pulse 70 (07/26/23 1117)   Resp 18 (07/26/23 1117)    SpO2 96 % (07/26/23 1117)

## 2023-07-26 NOTE — DISCHARGE INSTR - AVS FIRST PAGE
Expect to see blood in the urine, and to experience urgency/frequency/burning with urination and dribbling. This is normal after urological procedures. Is also normal to experience some nausea after these procedures. Go back your regular diet carefully. Call for fever greater that 101.5, inability to urinate, prolonged nausea and vomiting, or severe pain not relieved by pain medications. .    No driving/operating machinery for 24 hours, and while taking narcotics. Take over the counter remedy of choice to avoid constipation. Drink plenty of fluids.

## 2023-07-26 NOTE — H&P
Saeid Woodson MD  Physician  Specialty:  Urology  Progress Notes      Signed  Encounter Date:  2023  Procedure Orders   Cystoscopy [[id number]] ordered by BRITTANY Harris          Signed        Expand All Collapse All    [address] [facility]  [address] [address]  [phone]  [email or web address]        NAME: Lisa Wolfe  AGE: 78 y.o. SEX: female  : 1940            MRN: [id number]     DATE: 2023  TIME: 2:10 PM history and physical-CVS and lungs updated, no changes, plan cystoscopy with Botox 100 international units injected into the bladder. The risks of bleeding infection and urinary retention and botulism explained and she gives informed consent.     Assessment and Plan:     Refractory urge incontinence and overactive bladder, spondee to Botox but failed medications. 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 Urine culture was sent and I will give her a full course of antibiotics to see if we can clear this up-this will be Keflex 500 mg p.o. 4 times daily for 7 days. It is possible that her symptoms may resolve with clearing of the UTI. If she still has a symptoms after the urinary tract infection is cleared, we will set her up for Botox upon her request.                     Chief Complaint   No chief complaint on file.        History of Present Illness   78year-old woman, established patient but new to me-seen as a new patient by Mr. Santos Check May 5, 2023 for overactive bladder with urge incontinence. She has failed multiple oral therapies to include Sanctura, Ditropan, Myrbetriq and more recently Cathy. She had been undergoing Botox injections with the last injection 2022 by Dr. Anna Marie Pena with [facility] urolog. She was set up for cystoscopy for evaluation and consideration for additional Botox therapy.   She has history of right renal cell carcinoma with history of right nephrectomy. No recent upper tract imaging. The Botox was working for her, and the last injection was perhaps August or September of last year.          Cystoscopy      Date/Time 5/17/2023 1:30 PM      Performed by  Roma Gaston MD      Authorized by BRITTANY Lancaster       Universal Protocol:  Consent: Verbal consent obtained. Written consent obtained.      Procedure Details:  Procedure type: cystoscopy    Additional Procedure Details: Cystoscopy Procedure Note           Pre-operative Diagnosis: Refractory overactive bladder     Post-operative Diagnosis: Same, cystitis cystica with possible active UTI     Procedure: Flexible cystoscopy     Surgeon: Joyce Jeong MD     Anesthesia: 1% Xylocaine per urethra     EBL: Minimal     Complications: none     Procedure Details   The risks, benefits, complications, treatment options, and expected outcomes were discussed with the patient. The patient concurred with the proposed plan, giving informed consent.     Cystoscopy was performed today under local anesthesia, using sterile technique. The patient was placed in the supine position, prepped with Betadine, and draped in the usual sterile fashion. The flexible cystocope was used to inspect both the urethra and bladder     Findings:  Urethra: Normal without stricture. Introitus normal for age urethra somewhat retracted.     Bladder: Trabeculation, cloudy urine and cystitis cystica. .  The orifices were orthotopic and intact. 60 cc of urine was removed with a syringe and sent for culture. Specimens: Urine culture                 Complications:  None           Disposition: To home            Condition:  Stable              The following portions of the patient's history were reviewed and updated as appropriate: allergies, current medications, past family history, past medical history, past social history, past surgical history and problem list.  Medical History   History reviewed.  No pertinent past medical history. Surgical History   History reviewed. No pertinent surgical history. shoulder  Review of Systems   Review of Systems   Constitutional: Negative for fever. Respiratory: Negative for shortness of breath. Cardiovascular: Negative for chest pain. Genitourinary: Positive for frequency. As per HPI         Active Problem List   There is no problem list on file for this patient.        Objective   /70   Pulse 82   Ht 5' 2" (1.575 m)   Wt 81.6 kg (180 lb)   SpO2 (!) 87%   BMI 32.92 kg/m²      Physical Exam  Vitals reviewed. Constitutional:       Appearance: Normal appearance. HENT:      Head: Normocephalic and atraumatic. Eyes:      Extraocular Movements: Extraocular movements intact. Pulmonary:      Effort: Pulmonary effort is normal.   Genitourinary:     General: Normal vulva. Vagina: No vaginal discharge. Musculoskeletal:         General: Normal range of motion. Cervical back: Normal range of motion. Skin:     Coloration: Skin is not jaundiced or pale. Neurological:      General: No focal deficit present. Mental Status: She is alert and oriented to person, place, and time. Psychiatric:         Mood and Affect: Mood normal.         Behavior: Behavior normal.         Thought Content:  Thought content normal.         Judgment: Judgment normal.                  Current Medications      Current Outpatient Medications:   •  cephalexin (KEFLEX) 500 mg capsule, Take 1 capsule (500 mg total) by mouth 1 (one) time for 1 dose Take after procedure, Disp: 1 capsule, Rfl: 0  •  escitalopram (LEXAPRO) 5 mg tablet, Take 5 mg by mouth daily, Disp: , Rfl:   •  lisinopril (ZESTRIL) 40 mg tablet, Take 1 tablet by mouth daily, Disp: , Rfl:   •  omeprazole (PriLOSEC) 20 mg delayed release capsule, Take 20 mg by mouth daily, Disp: , Rfl:   •  pramipexole (MIRAPEX) 0.25 mg tablet, Take 1 tablet (0.25 mg total) by mouth nightly., Disp: , Rfl:            Nick Nevarez Gilmer Campbell MD                     Electronically signed by Efren Robertson MD at 5/17/2023  2:17 PM   Procedure visit on 5/17/2023     Procedure visit on 5/17/2023      Note shared with patient  Additional Documentation    Vitals:   /70   Pulse 82   Ht 5' 2" (1.575 m)   Wt 81.6 kg (180 lb)   SpO2 87% Important     BMI 32.92 kg/m²   BSA 1.83 m²   SmartForms:    [facility] PRE-CHARTING •    [facility] PCMH/PCSP WRAP UP REQUIREMENTS ADVANCED      Encounter Info:   Billing Info,   History,   Allergies,   Detailed Report        Orders Placed       Urine culture     POCT urine dip  All Encounter Results  Medication Changes       Cephalexin 500 mg Oral Every 6 hours scheduled  Medication List  Visit Diagnoses       Urinary incontinence, unspecified type     Overactive bladder     Cystitis cystica     Acute cystitis without hematuria  Problem List

## 2023-07-26 NOTE — ANESTHESIA PREPROCEDURE EVALUATION
Procedure:  INJECTION BOTULINUM TOXIN (BOTOX) (Bilateral: Urethra)    Relevant Problems   ANESTHESIA (within normal limits)      CARDIO   (+) HTN (hypertension)   (+) Mitral regurgitation      GI/HEPATIC   (+) Gastroesophageal reflux disease      HEMATOLOGY  history of DVT          Physical Exam    Airway    Mallampati score: II  TM Distance: >3 FB  Neck ROM: full     Dental   No notable dental hx upper dentures and lower dentures,     Cardiovascular      Pulmonary      Other Findings        Anesthesia Plan  ASA Score- 3     Anesthesia Type- IV sedation with anesthesia with ASA Monitors. Additional Monitors:   Airway Plan:           Plan Factors-Exercise tolerance (METS): >4 METS. Chart reviewed. Existing labs reviewed. Patient summary reviewed. Induction- intravenous. Postoperative Plan-     Informed Consent- Anesthetic plan and risks discussed with patient. I personally reviewed this patient with the CRNA. Discussed and agreed on the Anesthesia Plan with the CRNA. Danette Johnson

## 2023-07-26 NOTE — OP NOTE
OPERATIVE REPORT  PATIENT NAME: Tramaine Lane    :  1940  MRN: [id number]  Pt Location: [facility] OR ROOM 01    SURGERY DATE: 2023    Surgeon(s) and Role:     * Valarie Gracia MD - Primary    Preop Diagnosis:  Overactive bladder [N32.81]  Other urinary incontinence [N39.498]    Post-Op Diagnosis Codes:     * Overactive bladder [N32.81]     * Other urinary incontinence [N39.498]    Procedure(s):  Bilateral - INJECTION BOTULINUM TOXIN (BOTOX) 100 international units    Specimen(s):  * No specimens in log *    Estimated Blood Loss:   Minimal    Drains:  * No LDAs found *    Anesthesia Type:   General/LMA    Operative Indications:  Overactive bladder [N32.81]  Other urinary incontinence [N39.498]      Operative Findings:  100 international units injected into the posterior wall the bladder. Complications:   None    Procedure and Technique:  78-year-old woman with refractory urge incontinence and overactive bladder, has responded to Botox in the past with her previous urologist but has failed medications. We treated her for cystitis cystica to see if this would help with her symptoms but they did not so she wishes to have another Botox injection. This will be 100 international units. The risks of bleeding infection urinary retention and botulism were explained and she gives informed consent. The patient was brought to the operating room and identified. After IV sedation anesthesia was induced, the patient was prepared and draped in the dorsolithotomy position in the usual fashion, with standard care taken to protect pressure points. A time out was performed. Cystoscopy was carried out with a 22 Georgian cystoscopy sheath and 30 degree lens. The urethra was normal without stricture. The bladder was smooth, nontrabeculated, and there were no stones, tumors or other lesion.  The [facility] Needle was used to inject 1cc aliquots for 30 aliquots covering the posterior wall of the bladder an bas fond, for a total of 100 international units. The Bladder was then drained, the patient awakened, and transferred to the PACU in good condition. I was present for the entire procedure. and A qualified resident physician was not available.     Patient Disposition:  PACU  and hemodynamically stable        SIGNATURE: David Manuel MD  DATE: July 26, 2023  TIME: 9:27 AM

## 2023-08-24 ENCOUNTER — APPOINTMENT (RX ONLY)
Dept: URBAN - NONMETROPOLITAN AREA CLINIC 4 | Facility: CLINIC | Age: 83
Setting detail: DERMATOLOGY
End: 2023-08-24

## 2023-08-24 DIAGNOSIS — L60.3 NAIL DYSTROPHY: ICD-10-CM | Status: IMPROVED

## 2023-08-24 DIAGNOSIS — T07XXXA ABRASION OR FRICTION BURN OF OTHER, MULTIPLE, AND UNSPECIFIED SITES, WITHOUT MENTION OF INFECTION: ICD-10-CM

## 2023-08-24 PROBLEM — S50.311A ABRASION OF RIGHT ELBOW, INITIAL ENCOUNTER: Status: ACTIVE | Noted: 2023-08-24

## 2023-08-24 PROBLEM — S80.212A ABRASION, LEFT KNEE, INITIAL ENCOUNTER: Status: ACTIVE | Noted: 2023-08-24

## 2023-08-24 PROCEDURE — 99213 OFFICE O/P EST LOW 20 MIN: CPT

## 2023-08-24 PROCEDURE — ? COUNSELING

## 2023-08-24 PROCEDURE — ? PRESCRIPTION

## 2023-08-24 PROCEDURE — ? PRESCRIPTION MEDICATION MANAGEMENT

## 2023-08-24 RX ORDER — MUPIROCIN 20 MG/G
2% OINTMENT TOPICAL BID
Qty: 1 | Refills: 3 | Status: ERX | COMMUNITY
Start: 2023-08-24

## 2023-08-24 RX ADMIN — MUPIROCIN 2%: 20 OINTMENT TOPICAL at 00:00

## 2023-08-24 ASSESSMENT — LOCATION ZONE DERM
LOCATION ZONE: FINGERNAIL
LOCATION ZONE: ARM
LOCATION ZONE: LEG

## 2023-08-24 ASSESSMENT — LOCATION DETAILED DESCRIPTION DERM
LOCATION DETAILED: RIGHT ELBOW
LOCATION DETAILED: LEFT THUMBNAIL
LOCATION DETAILED: LEFT KNEE

## 2023-08-24 ASSESSMENT — LOCATION SIMPLE DESCRIPTION DERM
LOCATION SIMPLE: RIGHT ELBOW
LOCATION SIMPLE: LEFT KNEE
LOCATION SIMPLE: LEFT THUMBNAIL

## 2023-08-24 NOTE — PROCEDURE: PRESCRIPTION MEDICATION MANAGEMENT
Detail Level: Zone
Initiate Treatment: Mupiricin ointment BID
Render In Strict Bullet Format?: No
Continue Regimen: Clotrimazole betamethasone QD

## 2024-01-04 ENCOUNTER — APPOINTMENT (RX ONLY)
Dept: URBAN - NONMETROPOLITAN AREA CLINIC 4 | Facility: CLINIC | Age: 84
Setting detail: DERMATOLOGY
End: 2024-01-04

## 2024-01-04 DIAGNOSIS — B35.1 TINEA UNGUIUM: ICD-10-CM

## 2024-01-04 PROCEDURE — ? PRESCRIPTION

## 2024-01-04 PROCEDURE — 99214 OFFICE O/P EST MOD 30 MIN: CPT

## 2024-01-04 PROCEDURE — ? PHOTO-DOCUMENTATION

## 2024-01-04 PROCEDURE — ? PRESCRIPTION MEDICATION MANAGEMENT

## 2024-01-04 PROCEDURE — ? COUNSELING

## 2024-01-04 RX ORDER — CICLOPIROX 80 MG/ML
8% SOLUTION TOPICAL QD
Qty: 1 | Refills: 6 | Status: ERX | COMMUNITY
Start: 2024-01-04

## 2024-01-04 RX ADMIN — CICLOPIROX 8%: 80 SOLUTION TOPICAL at 00:00

## 2024-01-04 ASSESSMENT — LOCATION DETAILED DESCRIPTION DERM: LOCATION DETAILED: LEFT THUMBNAIL

## 2024-01-04 ASSESSMENT — LOCATION SIMPLE DESCRIPTION DERM: LOCATION SIMPLE: LEFT THUMBNAIL

## 2024-01-04 ASSESSMENT — LOCATION ZONE DERM: LOCATION ZONE: FINGERNAIL

## 2024-01-04 NOTE — PROCEDURE: PRESCRIPTION MEDICATION MANAGEMENT
Discontinue Regimen: Clotrimazole-betamethasone cream
Render In Strict Bullet Format?: No
Initiate Treatment: Ciclopirox solution QD
Detail Level: Zone

## 2024-04-08 ENCOUNTER — APPOINTMENT (RX ONLY)
Dept: URBAN - NONMETROPOLITAN AREA CLINIC 4 | Facility: CLINIC | Age: 84
Setting detail: DERMATOLOGY
End: 2024-04-08

## 2024-04-08 DIAGNOSIS — B35.1 TINEA UNGUIUM: ICD-10-CM | Status: WORSENING

## 2024-04-08 PROCEDURE — ? DEFER

## 2024-04-08 PROCEDURE — 99213 OFFICE O/P EST LOW 20 MIN: CPT

## 2024-04-08 PROCEDURE — ? COUNSELING

## 2024-04-08 PROCEDURE — ? TREATMENT REGIMEN

## 2024-04-08 ASSESSMENT — LOCATION SIMPLE DESCRIPTION DERM
LOCATION SIMPLE: LEFT THUMBNAIL
LOCATION SIMPLE: RIGHT THUMBNAIL

## 2024-04-08 ASSESSMENT — LOCATION DETAILED DESCRIPTION DERM
LOCATION DETAILED: RIGHT THUMBNAIL
LOCATION DETAILED: LEFT THUMBNAIL

## 2024-04-08 ASSESSMENT — LOCATION ZONE DERM: LOCATION ZONE: FINGERNAIL

## 2024-04-08 NOTE — PROCEDURE: DEFER
Reason To Defer Override: Refer to podiatrist for nail removal
Introduction Text (Please End With A Colon): The following procedure was deferred:e
Size Of Lesion In Cm (Optional): 0
Detail Level: Detailed

## 2024-04-25 ENCOUNTER — TELEPHONE (OUTPATIENT)
Dept: UROLOGY | Facility: CLINIC | Age: 84
End: 2024-04-25

## 2024-04-25 NOTE — TELEPHONE ENCOUNTER
Called pt, spoke to , discussed scheduling yearly appt. Has not been seen since botox 7/26/23. He relayed message to her and she declined. Stated things have been great since procedure and curtailed fluid intake after dinner. Hasn't been getting up at night to use bathroom. Pt will call to schedule appt if needed. Provider to be notified

## 2024-07-22 ENCOUNTER — TELEPHONE (OUTPATIENT)
Age: 84
End: 2024-07-22

## 2024-07-22 NOTE — TELEPHONE ENCOUNTER
called for his wife. He said she would like a Botox procedure with Dr. Mendiola again. She had one last year in July.     CB: [phone]

## 2024-07-22 NOTE — TELEPHONE ENCOUNTER
Please advise if patient needs office visit prior to scheduling Botox procedure. Last Botox 7/2023.  Thanks

## 2024-07-23 NOTE — TELEPHONE ENCOUNTER
Contacted patients home number and patient answered the phone- her spouse is not available at this time. Advised patient she will require an office visit prior to scheduling Botox as she has not been seen in over a year. Patient was scheduled for a follow up appointment with BRITTANY Upton on 8/27/24 @ 3:40 pm to discuss Botox.

## 2024-08-27 ENCOUNTER — OFFICE VISIT (OUTPATIENT)
Dept: UROLOGY | Facility: CLINIC | Age: 84
End: 2024-08-27
Payer: MEDICARE

## 2024-08-27 VITALS
SYSTOLIC BLOOD PRESSURE: 122 MMHG | TEMPERATURE: 97.2 F | HEIGHT: 62 IN | DIASTOLIC BLOOD PRESSURE: 58 MMHG | HEART RATE: 74 BPM | WEIGHT: 186.7 LBS | BODY MASS INDEX: 34.36 KG/M2 | OXYGEN SATURATION: 91 %

## 2024-08-27 DIAGNOSIS — N32.81 OVERACTIVE BLADDER: Primary | ICD-10-CM

## 2024-08-27 DIAGNOSIS — N39.45 CONTINUOUS LEAKAGE OF URINE: ICD-10-CM

## 2024-08-27 PROCEDURE — 99213 OFFICE O/P EST LOW 20 MIN: CPT

## 2024-08-27 RX ORDER — METOPROLOL SUCCINATE 25 MG/1
TABLET, EXTENDED RELEASE ORAL DAILY
COMMUNITY
Start: 2024-05-06

## 2024-08-27 RX ORDER — MEMANTINE HYDROCHLORIDE 10 MG/1
10 TABLET ORAL 2 TIMES DAILY
COMMUNITY
Start: 2024-07-09 | End: 2025-02-04

## 2024-08-27 RX ORDER — AMLODIPINE BESYLATE 5 MG/1
5 TABLET ORAL DAILY
COMMUNITY

## 2024-08-27 RX ORDER — EMPAGLIFLOZIN 10 MG/1
10 TABLET, FILM COATED ORAL DAILY
COMMUNITY
Start: 2024-08-05

## 2024-08-27 RX ORDER — APIXABAN 5 MG/1
5 TABLET, FILM COATED ORAL 2 TIMES DAILY
COMMUNITY
Start: 2024-08-05

## 2024-08-27 RX ORDER — FUROSEMIDE 40 MG
60 TABLET ORAL DAILY
COMMUNITY
Start: 2024-08-02 | End: 2024-10-31

## 2024-08-27 NOTE — PROGRESS NOTES
8/27/2024    Chief Complaint   Patient presents with    Follow-up     Botox discussion       Assessment and Plan    84 y.o. female manage by AP Team and Dr. Mendiola    1.  Overactive bladder with urge incontinence.  Longstanding history and has failed multiple oral therapies in the past.  She gets good response to Botox which she last received by Dr. Mendiola July 2023.  Since the last time we saw her, she had acute hospitalization at [facility] in May 2024 for acute respiratory failure with hypoxia as well as CHF exacerbation, she is now on chronic 2 L nasal cannula.  She will require cardiology and pulmonology clearance before proceeding with Botox as this is done under IV sedation in the operating room.  I did place a case request for cystoscopy with Botox injections, our surgery scheduler will call her to schedule this.  I will see her for H&P visit and to sign consent prior to this.  She has appointments upcoming with cardiology as well as pulmonology next month.      Interval HPI:    She presents today reporting doing well overall.  She saw [facility] urology earlier this year as they this was closer to their home.  They were not happy with their care and have decided to continue their care with [facility] urology.    History of Present Illness  Lisa Wolfe is a 84 y.o. female here for follow-up evaluation of OAB with urge incontinence     Established patient with history of overactive bladder with urge incontinence.  She was initially seen by me in consultation in May 2023 and had previously been following with Dr. Gamez with [facility].  She has failed multiple oral therapies in the past including Sanctura, Ditropan, Myrbetriq, as well is Gemtesa.  She had been undergoing Botox injections which have been working for her.  I recommended cystoscopy evaluation with Dr. Mendiola which was completed on 5/17/2023.  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  Dr. Mendiola recommended  "that if her symptoms persisted after antibiotic treatment we could schedule her for Botox upon her request.  She would undergo Botox 100 international units by Dr. Mendiola on 7/26/2023.  Since then she has been lost to follow-up.  She last saw [facility] urology March 2024.        Review of Systems   Constitutional:  Negative for chills and fever.   HENT:  Negative for ear pain and sore throat.    Eyes:  Negative for pain and visual disturbance.   Respiratory:  Negative for cough and shortness of breath.    Cardiovascular:  Negative for chest pain and palpitations.   Gastrointestinal:  Negative for abdominal pain and vomiting.   Genitourinary:  Positive for frequency and urgency. Negative for dysuria and hematuria.        Incontinence    Musculoskeletal:  Negative for arthralgias and back pain.   Skin:  Negative for color change and rash.   Neurological:  Negative for seizures and syncope.   All other systems reviewed and are negative.              Vitals  Vitals:    08/27/24 1543   BP: 122/58   Pulse: 74   Temp: (!) 97.2 °F (36.2 °C)   SpO2: 91%   Weight: 84.7 kg (186 lb 11.2 oz)   Height: 5' 2\" (1.575 m)       Physical Exam  Vitals reviewed.   Constitutional:       General: She is not in acute distress.     Appearance: Normal appearance. She is normal weight. She is not ill-appearing or toxic-appearing.   HENT:      Head: Normocephalic and atraumatic.      Nose: Nose normal.   Eyes:      General: No scleral icterus.     Conjunctiva/sclera: Conjunctivae normal.   Cardiovascular:      Rate and Rhythm: Normal rate.      Pulses: Normal pulses.   Pulmonary:      Effort: Pulmonary effort is normal. No respiratory distress.      Comments: Chronic 2L NC  Abdominal:      General: Abdomen is flat.      Palpations: Abdomen is soft.      Tenderness: There is no abdominal tenderness. There is no right CVA tenderness or left CVA tenderness.      Hernia: No hernia is present.   Musculoskeletal:         General: Normal range of motion. "      Cervical back: Normal range of motion.   Skin:     General: Skin is warm and dry.   Neurological:      General: No focal deficit present.      Mental Status: She is alert and oriented to person, place, and time. Mental status is at baseline.   Psychiatric:         Mood and Affect: Mood normal.         Behavior: Behavior normal.         Thought Content: Thought content normal.         Judgment: Judgment normal.         Past History  Past Medical History:   Diagnosis Date    Anxiety     Breast CA (HCC)     left    Cancer (HCC)     kidney    Colon cancer (HCC)     GERD (gastroesophageal reflux disease)     History of transfusion     Hypertension     Walker as ambulation aid      Social History     Socioeconomic History    Marital status: /Civil Union     Spouse name: None    Number of children: None    Years of education: None    Highest education level: None   Occupational History    None   Tobacco Use    Smoking status: Never    Smokeless tobacco: Never   Vaping Use    Vaping status: Never Used   Substance and Sexual Activity    Alcohol use: Yes     Comment: rarely    Drug use: Never    Sexual activity: Not Currently   Other Topics Concern    None   Social History Narrative    None     Social Determinants of Health     Financial Resource Strain: Low Risk  (5/22/2024)    Received from [facility]    Overall Financial Resource Strain (CARDIA)     Difficulty of Paying Living Expenses: Not hard at all   Food Insecurity: No Food Insecurity (5/22/2024)    Received from [facility]    Hunger Vital Sign     Worried About Running Out of Food in the Last Year: Never true     Ran Out of Food in the Last Year: Never true   Transportation Needs: No Transportation Needs (5/22/2024)    Received from [facility]    PRAPARE - Transportation     Lack of Transportation (Medical): No     Lack of Transportation (Non-Medical): No   Physical Activity: Not on file   Stress: Not  "on file   Social Connections: Not on file   Intimate Partner Violence: Not At Risk (5/22/2024)    Received from [facility]    Humiliation, Afraid, Rape, and Kick questionnaire     Fear of Current or Ex-Partner: No     Emotionally Abused: No     Physically Abused: No     Sexually Abused: No   Housing Stability: Low Risk  (5/22/2024)    Received from [facility]    Housing Stability Vital Sign     Unable to Pay for Housing in the Last Year: No     Number of Times Moved in the Last Year: 1     Homeless in the Last Year: No     Social History     Tobacco Use   Smoking Status Never   Smokeless Tobacco Never     History reviewed. No pertinent family history.    The following portions of the patient's history were reviewed and updated as appropriate allergies, current medications, past medical history, past social history, past surgical history and problem list    Imaging:    Results  No results found for this or any previous visit (from the past 1 hour(s)).]  No results found for: \"PSA\"  Lab Results   Component Value Date    CALCIUM 9.4 06/10/2024    K 4.8 06/10/2024    CO2 34 (H) 06/10/2024    CL 95 (L) 06/10/2024    BUN 35 (H) 06/10/2024    CREATININE 0.98 06/10/2024     Lab Results   Component Value Date    WBC 7.86 07/24/2023    HGB 15.1 07/24/2023    HCT 48.0 (H) 07/24/2023    MCV 99 (H) 07/24/2023     07/24/2023       Please Note:  Voice dictation software has been used to create this document. There may be inadvertent transcriptions errors.     BRITTANY العلي 08/27/24   "

## 2024-09-04 ENCOUNTER — TELEPHONE (OUTPATIENT)
Dept: UROLOGY | Facility: CLINIC | Age: 84
End: 2024-09-04

## 2024-09-05 ENCOUNTER — PREP FOR PROCEDURE (OUTPATIENT)
Dept: UROLOGY | Facility: CLINIC | Age: 84
End: 2024-09-05

## 2024-09-05 DIAGNOSIS — Z01.818 ENCOUNTER FOR PREADMISSION TESTING: Primary | ICD-10-CM

## 2024-09-05 DIAGNOSIS — Z01.818 PREOPERATIVE CLEARANCE: ICD-10-CM

## 2024-09-05 NOTE — TELEPHONE ENCOUNTER
Spoke with patient and confirmed surgery date of: 10/31  Type of surgery: cysto, botox  Operating physician: Dr. Mendiola  Location of surgery: [facility]    Verbally went over prep with patient on: 9/5  NPO  Bowel prep? No  Hospital calls afternoon prior with arrival time -Calls Friday afternoon for Monday surgeries  Patient needs ride to and from surgery (outpatient)   Pre-op testing to be done 2 weeks prior to surgery  (CBC, BMP, UC, EKG)  Blood thinners: Eliquis  no hold needed  Clearances needed: (Medical & Cardiac)    Mailed to patient on: 9/5  Copy of packet scanned into Media on: 9/5  Labs in packet  Soap / Bowel prep in packet  Pre-op & Post-op in packet  Dates of H&P and post-op if needed    Consent: on admit    Medical/Cardiac Clearance: form faxed to PCP & cardiologist   Appt with:  Appt date and time:  Date clearance form faxed: 9/5  Best fax number:

## 2024-09-23 DIAGNOSIS — N32.81 OVERACTIVE BLADDER: Primary | ICD-10-CM

## 2024-09-23 NOTE — TELEPHONE ENCOUNTER
Attempted to return call to patient, however phone line is busy at this time. Per BRITTANY Upton's last office note patient has failed Sanctura, Ditropan, Myrbetriq, and Gemtesa and is currently scheduled with Dr. Mendiola on 10/31 for Botox. Wish to further discuss Gemtesa with patient and will try to call again tomorrow.

## 2024-09-23 NOTE — TELEPHONE ENCOUNTER
Patient called the RX Refill Line. Message is being forwarded to the office.     Patient is requesting   Vibegron (Gemtesa) 75 MG TABS  not on active medication list.    Please contact patient at  [phone]

## 2024-09-24 ENCOUNTER — TELEPHONE (OUTPATIENT)
Age: 84
End: 2024-09-24

## 2024-09-24 NOTE — TELEPHONE ENCOUNTER
Tristen from [facility] called stating the Gemtasa will require a prior authorization. Please advise.

## 2024-09-24 NOTE — TELEPHONE ENCOUNTER
Called and spoke with patient regarding Gemtesta medication. Patient states medication did help her a little bit and she would like to take it at least until she is scheduled to have Botox to assist with OAB. Patient is requesting that Gemtesa 75 mg be sent to [facility] in [place].

## 2024-09-24 NOTE — TELEPHONE ENCOUNTER
Pt's plan has VIBEGRON (GEMTESA) on formulary. Prior Auth is not needed.    [employer] (financial assistance) requires a Medical Exception Form that needs to be completed and signed by clinical staff. This form can be obtained by calling [employer] at     Thank you

## 2024-09-24 NOTE — TELEPHONE ENCOUNTER
Called and spoke with Geovany at [employer]. He states the  for Gemtesa dropped out of participating with [employer] last year and there is nothing they can do as medication is no longer covered.     Contacted patient who states she spoke with the pharmacy and she will be picking up medication tomorrow. She is aware that the insurance does not cover the medication and she is able to pay out of pocket as she has done in the past.

## 2024-10-24 ENCOUNTER — LAB (OUTPATIENT)
Dept: LAB | Facility: HOSPITAL | Age: 84
End: 2024-10-24
Payer: MEDICARE

## 2024-10-24 DIAGNOSIS — N39.45 CONTINUOUS LEAKAGE OF URINE: ICD-10-CM

## 2024-10-24 DIAGNOSIS — Z01.818 ENCOUNTER FOR PREADMISSION TESTING: ICD-10-CM

## 2024-10-24 DIAGNOSIS — N32.81 OVERACTIVE BLADDER: ICD-10-CM

## 2024-10-24 LAB
ANION GAP SERPL CALCULATED.3IONS-SCNC: 5 MMOL/L (ref 4–13)
BASOPHILS # BLD AUTO: 0.03 THOUSANDS/ΜL (ref 0–0.1)
BASOPHILS NFR BLD AUTO: 1 % (ref 0–1)
BILIRUB UR QL STRIP: NEGATIVE
BUN SERPL-MCNC: 15 MG/DL (ref 5–25)
CALCIUM SERPL-MCNC: 9.2 MG/DL (ref 8.4–10.2)
CHLORIDE SERPL-SCNC: 102 MMOL/L (ref 96–108)
CLARITY UR: NORMAL
CO2 SERPL-SCNC: 31 MMOL/L (ref 21–32)
COLOR UR: COLORLESS
CREAT SERPL-MCNC: 0.64 MG/DL (ref 0.6–1.3)
EOSINOPHIL # BLD AUTO: 0.15 THOUSAND/ΜL (ref 0–0.61)
EOSINOPHIL NFR BLD AUTO: 2 % (ref 0–6)
ERYTHROCYTE [DISTWIDTH] IN BLOOD BY AUTOMATED COUNT: 12.7 % (ref 11.6–15.1)
GFR SERPL CREATININE-BSD FRML MDRD: 81 ML/MIN/1.73SQ M
GLUCOSE SERPL-MCNC: 133 MG/DL (ref 65–140)
GLUCOSE UR STRIP-MCNC: NEGATIVE MG/DL
HCT VFR BLD AUTO: 42.6 % (ref 34.8–46.1)
HGB BLD-MCNC: 13.4 G/DL (ref 11.5–15.4)
HGB UR QL STRIP.AUTO: NEGATIVE
IMM GRANULOCYTES # BLD AUTO: 0.01 THOUSAND/UL (ref 0–0.2)
IMM GRANULOCYTES NFR BLD AUTO: 0 % (ref 0–2)
KETONES UR STRIP-MCNC: NEGATIVE MG/DL
LEUKOCYTE ESTERASE UR QL STRIP: NEGATIVE
LYMPHOCYTES # BLD AUTO: 1.29 THOUSANDS/ΜL (ref 0.6–4.47)
LYMPHOCYTES NFR BLD AUTO: 20 % (ref 14–44)
MCH RBC QN AUTO: 31.5 PG (ref 26.8–34.3)
MCHC RBC AUTO-ENTMCNC: 31.5 G/DL (ref 31.4–37.4)
MCV RBC AUTO: 100 FL (ref 82–98)
MONOCYTES # BLD AUTO: 0.55 THOUSAND/ΜL (ref 0.17–1.22)
MONOCYTES NFR BLD AUTO: 8 % (ref 4–12)
NEUTROPHILS # BLD AUTO: 4.53 THOUSANDS/ΜL (ref 1.85–7.62)
NEUTS SEG NFR BLD AUTO: 69 % (ref 43–75)
NITRITE UR QL STRIP: NEGATIVE
NRBC BLD AUTO-RTO: 0 /100 WBCS
PH UR STRIP.AUTO: 7.5 [PH]
PLATELET # BLD AUTO: 174 THOUSANDS/UL (ref 149–390)
PMV BLD AUTO: 10.9 FL (ref 8.9–12.7)
POTASSIUM SERPL-SCNC: 4.4 MMOL/L (ref 3.5–5.3)
PROT UR STRIP-MCNC: NEGATIVE MG/DL
RBC # BLD AUTO: 4.25 MILLION/UL (ref 3.81–5.12)
SODIUM SERPL-SCNC: 138 MMOL/L (ref 135–147)
SP GR UR STRIP.AUTO: 1.01 (ref 1–1.03)
UROBILINOGEN UR STRIP-ACNC: <2 MG/DL
WBC # BLD AUTO: 6.56 THOUSAND/UL (ref 4.31–10.16)

## 2024-10-24 PROCEDURE — 85025 COMPLETE CBC W/AUTO DIFF WBC: CPT

## 2024-10-24 PROCEDURE — 80048 BASIC METABOLIC PNL TOTAL CA: CPT

## 2024-10-24 PROCEDURE — 81003 URINALYSIS AUTO W/O SCOPE: CPT

## 2024-10-24 PROCEDURE — 87086 URINE CULTURE/COLONY COUNT: CPT

## 2024-10-24 PROCEDURE — 36415 COLL VENOUS BLD VENIPUNCTURE: CPT

## 2024-10-25 LAB — BACTERIA UR CULT: NORMAL

## 2024-10-25 NOTE — PRE-PROCEDURE INSTRUCTIONS
Pre-Surgery Instructions:   Medication Instructions    Eliquis 5 MG Take Day of Surgery; unless usually taken at night - per Urology no hold needed    escitalopram (LEXAPRO) 5 mg tablet Take Day of Surgery; unless usually taken at night     lisinopril (ZESTRIL) 40 mg tablet Do not take day of surgery; continue as prescribed excluding DOS     memantine (NAMENDA) 10 mg tablet Take Day of Surgery; unless usually taken at night     metoprolol succinate (TOPROL-XL) 25 mg 24 hr tablet Take Day of Surgery; unless usually taken at night     multivitamin (THERAGRAN) TABS Avoid 1 week prior to surgery      pramipexole (MIRAPEX) 0.25 mg tablet Take Day of Surgery; unless usually taken at night     Vibegron 75 MG TABS Do not take day of surgery; continue as prescribed excluding DOS     Medication instructions for day surgery reviewed. Please use only a sip of water to take your instructed medications. Avoid all over the counter vitamins, supplements and NSAIDS for one week prior to surgery per anesthesia guidelines. Tylenol is ok to take as needed.     You will receive a call one business day prior to surgery with an arrival time and hospital directions. If your surgery is scheduled on a Monday, the hospital will be calling you on the Friday prior to your surgery. If you have not heard from anyone by 8pm, please call the hospital supervisor through the hospital  at [phone]. ([place] [phone] or [place] [phone]).    Do not eat or drink anything after midnight the night before your surgery, including candy, mints, lifesavers, or chewing gum. Do not drink alcohol 24hrs before your surgery. Try not to smoke at least 24hrs before your surgery.       Follow the pre surgery showering instructions as listed in the “My Surgical Experience Booklet” or otherwise provided by your surgeon's office. Do not use a blade to shave the surgical area 1 week before surgery. It is okay to use a clean electric clippers up to  24 hours before surgery. Do not apply any lotions, creams, including makeup, cologne, deodorant, or perfumes after showering on the day of your surgery. Do not use dry shampoo, hair spray, hair gel, or any type of hair products.     No contact lenses, eye make-up, or artificial eyelashes. Remove nail polish, including gel polish, and any artificial, gel, or acrylic nails if possible. Remove all jewelry including rings and body piercing jewelry.     Wear causal clothing that is easy to take on and off. Consider your type of surgery.    Keep any valuables, jewelry, piercings at home. Please bring any specially ordered equipment (sling, braces) if indicated.    Arrange for a responsible person to drive you to and from the hospital on the day of your surgery. Please confirm the visitor policy for the day of your procedure when you receive your phone call with an arrival time.     Call the surgeon's office with any new illnesses, exposures, or additional questions prior to surgery.    Please reference your “My Surgical Experience Booklet” for additional information to prepare for your upcoming surgery.

## 2024-10-30 ENCOUNTER — ANESTHESIA EVENT (OUTPATIENT)
Dept: PERIOP | Facility: HOSPITAL | Age: 84
End: 2024-10-30
Payer: MEDICARE

## 2024-10-30 NOTE — H&P
H&P Exam - Urology   Lisa Wolfe 1940 [id number]      Assessment/Plan     Assessment:  Overactive bladder with urge incontinence, status post Botox 100 international units July 2023 by me  Plan:  Repeat Botox 100 national units intravesical    History of Present Illness   HPI:  The patient presents for the above procedure. The risks of bleeding, infection, botulism and urinary retention and need for additional procedures has been explained and informed consent given.    Past Medical History:   Diagnosis Date    Anxiety     Breast CA (HCC)     left    Cancer (HCC)     kidney    Colon cancer (HCC)     GERD (gastroesophageal reflux disease)     History of transfusion     Hypertension     Walker as ambulation aid        Past Surgical History:   Procedure Laterality Date    APPENDECTOMY      BOTOX INJECTION Bilateral 7/26/2023    Procedure: Cystoscopy, INJECTION BOTULINUM TOXIN (BOTOX);  Surgeon: Drew Mendiola MD;  Location: [facility];  Service: Urology    BREAST LUMPECTOMY Left     CARPAL TUNNEL RELEASE Left     CATARACT EXTRACTION      CHOLECYSTECTOMY      COLECTOMY      splenic flexure    COLONOSCOPY      DILATION AND CURETTAGE OF UTERUS      FEMUR SURGERY Right 2022    JOINT REPLACEMENT Bilateral     knees    NEPHRECTOMY Left            Review of systems:    General: No fever.  CVS: No chest pain or new SOB.  Abdomen: No diarrhea or blood in stool.  : No new voiding difficulties.  Neuro: No syncope/weakness/loss of sensation/paresis  Ophthalmologic: No new visual changes.  Ortho: No new back/joint pains.    Social History- as per previous notes.        Family History: Family history non-contributory    Meds/Allergies   PTA meds:   Prior to Admission Medications   Prescriptions Last Dose Informant Patient Reported? Taking?   Ascorbic Acid (VITAMIN C PO)  Self Yes No   Sig: Take by mouth   Patient not taking: Reported on 8/27/2024   Eliquis 5 MG   Yes Yes   Sig: Take 5 mg by mouth 2 (two) times a day    Jardiance 10 MG TABS tablet   Yes No   Sig: Take 10 mg by mouth daily   Vibegron 75 MG TABS   No Yes   Sig: Take 75 mg by mouth in the morning   amLODIPine (NORVASC) 5 mg tablet   Yes No   Sig: Take 5 mg by mouth daily   clotrimazole-betamethasone (LOTRISONE) 1-0.05 % cream  Self Yes No   Sig: APPLY THIN LAYER EVERY DAY TO AFFECTED NAILS   Patient not taking: Reported on 8/27/2024   escitalopram (LEXAPRO) 5 mg tablet  Self Yes Yes   Sig: Take 5 mg by mouth daily   furosemide (LASIX) 40 mg tablet   Yes No   Sig: Take 60 mg by mouth daily   lisinopril (ZESTRIL) 40 mg tablet  Self Yes Yes   Sig: Take 1 tablet by mouth daily   memantine (NAMENDA) 10 mg tablet   Yes Yes   Sig: Take 10 mg by mouth 2 (two) times a day   metoprolol succinate (TOPROL-XL) 25 mg 24 hr tablet   Yes Yes   Sig: Take by mouth daily   multivitamin (THERAGRAN) TABS  Self Yes Yes   Sig: Take 1 tablet by mouth daily   pramipexole (MIRAPEX) 0.25 mg tablet  Self Yes Yes   Sig: daily at bedtime      Facility-Administered Medications: None         Objective   Vitals: There were no vitals taken for this visit.    No intake/output data recorded.          Physical Exam:    Awake, alert, in NAD.    HEENT: Atraumatic, Normocephalic    Lungs: Normal Respiratory effort, no wheezes,stridor or rales.  Clear to auscultation bilaterally    CVS- RRR normal heart sounds    Abdomen: Nondistended.    Extremiites: Normal ROM, no cyanosis/edema.      Lab Results: I have personally reviewed pertinent reports.    Imaging: Results Review Statement: I personally reviewed the following image studies/reports in PACS and discussed pertinent findings with Radiology: CT abdomen/pelvis. My interpretation of the radiology images/reports is: Concur.

## 2024-10-31 ENCOUNTER — ANESTHESIA (OUTPATIENT)
Dept: PERIOP | Facility: HOSPITAL | Age: 84
End: 2024-10-31
Payer: MEDICARE

## 2024-10-31 ENCOUNTER — HOSPITAL ENCOUNTER (OUTPATIENT)
Facility: HOSPITAL | Age: 84
Setting detail: OUTPATIENT SURGERY
Discharge: HOME | End: 2024-10-31
Attending: UROLOGY | Admitting: UROLOGY
Payer: MEDICARE

## 2024-10-31 VITALS
HEART RATE: 55 BPM | TEMPERATURE: 97.8 F | OXYGEN SATURATION: 100 % | RESPIRATION RATE: 20 BRPM | WEIGHT: 186 LBS | BODY MASS INDEX: 34.23 KG/M2 | SYSTOLIC BLOOD PRESSURE: 160 MMHG | HEIGHT: 62 IN | DIASTOLIC BLOOD PRESSURE: 68 MMHG

## 2024-10-31 DIAGNOSIS — N39.41 URGE INCONTINENCE: Primary | ICD-10-CM

## 2024-10-31 DIAGNOSIS — N32.81 OVERACTIVE BLADDER: ICD-10-CM

## 2024-10-31 PROBLEM — I50.32 CHRONIC DIASTOLIC HEART FAILURE (HCC): Status: ACTIVE | Noted: 2024-10-31

## 2024-10-31 PROBLEM — J96.11 CHRONIC HYPOXIC RESPIRATORY FAILURE (HCC): Status: ACTIVE | Noted: 2024-10-31

## 2024-10-31 PROBLEM — N39.45 CONTINUOUS LEAKAGE OF URINE: Status: ACTIVE | Noted: 2024-10-31

## 2024-10-31 PROCEDURE — 52287 CYSTOSCOPY CHEMODENERVATION: CPT | Performed by: UROLOGY

## 2024-10-31 PROCEDURE — NC001 PR NO CHARGE: Performed by: UROLOGY

## 2024-10-31 RX ORDER — LIDOCAINE HYDROCHLORIDE 10 MG/ML
INJECTION, SOLUTION EPIDURAL; INFILTRATION; INTRACAUDAL; PERINEURAL AS NEEDED
Status: DISCONTINUED | OUTPATIENT
Start: 2024-10-31 | End: 2024-10-31

## 2024-10-31 RX ORDER — MAGNESIUM HYDROXIDE 1200 MG/15ML
LIQUID ORAL AS NEEDED
Status: DISCONTINUED | OUTPATIENT
Start: 2024-10-31 | End: 2024-10-31 | Stop reason: HOSPADM

## 2024-10-31 RX ORDER — SODIUM CHLORIDE, SODIUM LACTATE, POTASSIUM CHLORIDE, CALCIUM CHLORIDE 600; 310; 30; 20 MG/100ML; MG/100ML; MG/100ML; MG/100ML
INJECTION, SOLUTION INTRAVENOUS CONTINUOUS PRN
Status: DISCONTINUED | OUTPATIENT
Start: 2024-10-31 | End: 2024-10-31

## 2024-10-31 RX ORDER — PROPOFOL 10 MG/ML
INJECTION, EMULSION INTRAVENOUS AS NEEDED
Status: DISCONTINUED | OUTPATIENT
Start: 2024-10-31 | End: 2024-10-31

## 2024-10-31 RX ORDER — PHENAZOPYRIDINE HYDROCHLORIDE 100 MG/1
200 TABLET, FILM COATED ORAL ONCE
Status: COMPLETED | OUTPATIENT
Start: 2024-10-31 | End: 2024-10-31

## 2024-10-31 RX ORDER — ONDANSETRON 2 MG/ML
INJECTION INTRAMUSCULAR; INTRAVENOUS AS NEEDED
Status: DISCONTINUED | OUTPATIENT
Start: 2024-10-31 | End: 2024-10-31

## 2024-10-31 RX ORDER — TOLTERODINE TARTRATE 2 MG/1
2 TABLET, EXTENDED RELEASE ORAL 2 TIMES DAILY
Qty: 60 TABLET | Refills: 11 | Status: SHIPPED | OUTPATIENT
Start: 2024-10-31

## 2024-10-31 RX ORDER — LABETALOL HYDROCHLORIDE 5 MG/ML
5 INJECTION, SOLUTION INTRAVENOUS
Status: COMPLETED | OUTPATIENT
Start: 2024-10-31 | End: 2024-10-31

## 2024-10-31 RX ORDER — CEFAZOLIN SODIUM 2 G/50ML
2000 SOLUTION INTRAVENOUS ONCE
Status: COMPLETED | OUTPATIENT
Start: 2024-10-31 | End: 2024-10-31

## 2024-10-31 RX ORDER — SODIUM CHLORIDE, SODIUM LACTATE, POTASSIUM CHLORIDE, CALCIUM CHLORIDE 600; 310; 30; 20 MG/100ML; MG/100ML; MG/100ML; MG/100ML
125 INJECTION, SOLUTION INTRAVENOUS CONTINUOUS
Status: DISCONTINUED | OUTPATIENT
Start: 2024-10-31 | End: 2024-10-31 | Stop reason: HOSPADM

## 2024-10-31 RX ORDER — PHENAZOPYRIDINE HYDROCHLORIDE 200 MG/1
200 TABLET, FILM COATED ORAL 3 TIMES DAILY PRN
Qty: 30 TABLET | Refills: 0 | Status: SHIPPED | OUTPATIENT
Start: 2024-10-31

## 2024-10-31 RX ADMIN — PROPOFOL 100 MCG/KG/MIN: 10 INJECTION, EMULSION INTRAVENOUS at 11:58

## 2024-10-31 RX ADMIN — ONDANSETRON 4 MG: 2 INJECTION INTRAMUSCULAR; INTRAVENOUS at 11:57

## 2024-10-31 RX ADMIN — PROPOFOL 50 MG: 10 INJECTION, EMULSION INTRAVENOUS at 11:57

## 2024-10-31 RX ADMIN — LABETALOL HYDROCHLORIDE 5 MG: 5 INJECTION, SOLUTION INTRAVENOUS at 12:40

## 2024-10-31 RX ADMIN — SODIUM CHLORIDE, SODIUM LACTATE, POTASSIUM CHLORIDE, AND CALCIUM CHLORIDE: .6; .31; .03; .02 INJECTION, SOLUTION INTRAVENOUS at 11:40

## 2024-10-31 RX ADMIN — PHENAZOPYRIDINE 200 MG: 100 TABLET ORAL at 13:09

## 2024-10-31 RX ADMIN — CEFAZOLIN SODIUM 2000 MG: 2 SOLUTION INTRAVENOUS at 11:53

## 2024-10-31 RX ADMIN — LABETALOL HYDROCHLORIDE 5 MG: 5 INJECTION, SOLUTION INTRAVENOUS at 13:14

## 2024-10-31 RX ADMIN — LIDOCAINE HYDROCHLORIDE 25 MG: 10 INJECTION, SOLUTION EPIDURAL; INFILTRATION; INTRACAUDAL; PERINEURAL at 11:57

## 2024-10-31 NOTE — ANESTHESIA POSTPROCEDURE EVALUATION
Post-Op Assessment Note    CV Status:  Stable    Pain management: adequate       Mental Status:  Alert and awake   Hydration Status:  Euvolemic   PONV Controlled:  Controlled   Airway Patency:  Patent     Post Op Vitals Reviewed: Yes    No anethesia notable event occurred.    Staff: CRNA           Last Filed PACU Vitals:  Vitals Value Taken Time   Temp 98.1    Pulse 55 10/31/24 1234   /76    Resp 23 10/31/24 1234   SpO2 100 % 10/31/24 1234   Vitals shown include unfiled device data.    Modified Ellis:  No data recorded

## 2024-10-31 NOTE — DISCHARGE INSTR - AVS FIRST PAGE
You have just undergone cystoscopy of the bladder.  Expect to see some blood in the urine, which should clear up within 24 to 48 hours.  You also may experience burning urgency and frequency of urination which is normal.  Call for fever greater than 101.5 degrees, severe pain not relieved by over-the-counter medicines, or inability to urinate.  You may resume all normal activities. .  Take the Pyridium as needed for burning, urgency and frequency.  Remember that you may not see the beneficial effects of the Botox for 2 weeks.  Stop taking the Gemtesa, start taking Detrol 2 mg twice daily.

## 2024-10-31 NOTE — OP NOTE
OPERATIVE REPORT  PATIENT NAME: Lisa Wolfe    :  1940  MRN: [id number]  Pt Location: [facility] OR ROOM 01    SURGERY DATE: 10/31/2024    Surgeons and Role:     * Drew Mendiola MD - Primary    Preop Diagnosis:  Overactive bladder [N32.81]  Continuous leakage of urine [N39.45]    Post-Op Diagnosis Codes:     * Overactive bladder [N32.81]     * Continuous leakage of urine [N39.45]    Procedure(s):  INJECTION BOTULINUM TOXIN (BOTOX) 100 international units    Specimen(s):  * No specimens in log *    Estimated Blood Loss:   Minimal    Drains:  * No LDAs found *    Anesthesia Type:   IV Sedation with Anesthesia    Operative Indications:  Overactive bladder [N32.81]  Continuous leakage of urine [N39.45]  As above    Operative Findings:  Normal bladder, 100 international units injected into the posterior wall of the bladder in a graded fashion, 30 cc normal saline in 1 cc aliquots      Complications:   None    Procedure and Technique:  84-year-old woman with refractory urge incontinence and overactive bladder, nocturia-had Botox in  which she did not think helped very much but she is not a candidate for anything else so she wishes to try it again.  The risks of bleeding infection damage urinary tract need for additional procedures urinary retention and botulism were explained and she gives informed consent.  We will use 100 international units as before.  We also discussed other options.  She is not InterStim candidate.  She is on oxygen at home and already has dementia and is on Namenda.  I did discuss possibility of a Hurt catheter for respite or an ongoing  thing if she wishes.    The patient was brought to the operating room identified properly.  IV sedation was induced the patient was prepped and draped in the dorsolithotomy position usual fashion.  A timeout was performed.  Cystoscopy was carried out with a 22 Chadian cystoscopy sheath and 30 lens.  The urethra was normal the stricture.  The bladder was  smooth not trabeculated and there were no stones tumors or other lesions.  The orifices were orthotopic and intact.  100 international units given over 31 cc aliquots was injected into the back wall the bladder in a square grade fashion.  Hemostasis was good, no issues, the bladder was drained.   I was present for the entire procedure. and A qualified resident physician was not available.    Patient Disposition:  PACU  and hemodynamically stable             SIGNATURE: Drew Mendiola MD  DATE: October 31, 2024  TIME: 12:26 PM

## 2024-10-31 NOTE — ANESTHESIA PREPROCEDURE EVALUATION
"Procedure:  INJECTION BOTULINUM TOXIN (BOTOX) (Urethra)    Eliquis last taken yesterday  Lisinopril and metoprolol last yesterday    Tolerated small dose of fentanyl during last anesthetic    Relevant Problems   CARDIO   (+) HTN (hypertension)   (+) Mitral regurgitation      GI/HEPATIC   (+) Colon cancer (HCC)   (+) Gastroesophageal reflux disease      GYN   (+) Breast CA (HCC)      PULMONARY   (+) Chronic hypoxic respiratory failure (HCC) (On 2L home o2)      Neurology/Sleep   (+) Walker as ambulation aid      Cardiovascular/Peripheral Vascular   (+) Chronic diastolic heart failure (HCC)   Allergy list:  Aspirin  Celecoxib  Conjugated Estrogens  Diclofenac  Duloxetine Hcl  Fentanyl  Hydrocodone  Medical Tape  Medroxyprogesterone  Misoprostol  Orphenadrine  Oxycodone  Pregabalin  Raloxifene  Rofecoxib  Sertraline  Tizanidine     Physical Exam    Airway    Mallampati score: II  TM Distance: >3 FB  Neck ROM: full     Dental    upper dentures    Cardiovascular      Pulmonary      Other Findings  post-pubertal.      Anesthesia Plan  ASA Score- 3     Anesthesia Type- IV sedation with anesthesia with ASA Monitors.         Additional Monitors:     Airway Plan:     Comment: Recent labs personally reviewed:  Lab Results       Component                Value               Date                       WBC                      6.56                10/24/2024                 HGB                      13.4                10/24/2024                 PLT                      174                 10/24/2024            Lab Results       Component                Value               Date                       K                        4.4                 10/24/2024                 BUN                      15                  10/24/2024                 CREATININE               0.64                10/24/2024            No results found for: \"PTT\"   Lab Results       Component                Value               Date                       INR      "                 1.1                 09/06/2024              Blood type     I, Antonette Angel MD, have personally seen and evaluated the patient prior to anesthetic care.  I have reviewed the pre-anesthetic record, medical history, allergies, medications and any other medical records if appropriate to the anesthetic care.  If a CRNA is involved in the case, I have reviewed the CRNA assessment, if present, and agree. Patient consented for IV Sedation, general anesthesia as back up. Discussed risks of aspiration, IV infiltration, indications for conversion to general anesthesia. All questions and concerns addressed.     .       Plan Factors-Exercise tolerance (METS): <4 METS.    Chart reviewed. EKG reviewed. Imaging results reviewed. Existing labs reviewed. Patient summary reviewed.    Patient is not a current smoker.  Patient did not smoke on day of surgery.            Induction- intravenous.    Postoperative Plan-         Informed Consent- Anesthetic plan and risks discussed with patient.  I personally reviewed this patient with the CRNA. Discussed and agreed on the Anesthesia Plan with the CRNA..         all other ROS negative except as per HPI

## 2024-10-31 NOTE — ANESTHESIA POSTPROCEDURE EVALUATION
Post-Op Assessment Note    CV Status:  Stable    Pain management: adequate       Mental Status:  Alert and awake   Hydration Status:  Euvolemic   PONV Controlled:  Controlled   Airway Patency:  Patent     Post Op Vitals Reviewed: Yes    No anethesia notable event occurred.    Staff: Anesthesiologist           Last Filed PACU Vitals:  Vitals Value Taken Time   Temp 97.7 °F (36.5 °C) 10/31/24 1230   Pulse 54 10/31/24 1324   /70 10/31/24 1323   Resp 17 10/31/24 1324   SpO2 99 % 10/31/24 1324   Vitals shown include unfiled device data.    Modified Ellis:  Activity: 2 (10/31/2024  1:30 PM)  Respiration: 2 (10/31/2024  1:30 PM)  Circulation: 2 (10/31/2024  1:30 PM)  Consciousness: 2 (10/31/2024  1:30 PM)  Oxygen Saturation: 1 (10/31/2024  1:30 PM)  Modified Ellis Score: 9 (10/31/2024  1:30 PM)

## 2024-11-01 ENCOUNTER — TELEPHONE (OUTPATIENT)
Dept: UROLOGY | Facility: CLINIC | Age: 84
End: 2024-11-01

## 2024-11-01 NOTE — TELEPHONE ENCOUNTER
Post Op Note    Lisa Wolfe is a 84 y.o. female s/p INJECTION BOTULINUM TOXIN (BOTOX) (Urethra)  performed 10/31/2024.  Lisa Wolfe is a patient of Dr. Dr. Mendiola and is seen at the [place] office.     How would you rate your pain on a scale from 1 to 10, 10 being the worst pain ever? 0    Have you had a fever? No    Have your bowel movements been regular? Yes    Do you have any difficulty urinating? No    Do you have any other questions or concerns that I can address at this time?     Patient reports some burning with urination which she is knows is to be expected. She was advised to increase her water intake and take pyridium prn. Patient confirmed her follow up appointment with BRITTANY Upton on 11/27/24 in the [place] office.

## 2024-11-27 ENCOUNTER — OFFICE VISIT (OUTPATIENT)
Dept: UROLOGY | Facility: CLINIC | Age: 84
End: 2024-11-27

## 2024-11-27 VITALS
BODY MASS INDEX: 33.68 KG/M2 | HEART RATE: 60 BPM | HEIGHT: 62 IN | WEIGHT: 183 LBS | SYSTOLIC BLOOD PRESSURE: 140 MMHG | TEMPERATURE: 97.8 F | OXYGEN SATURATION: 96 % | DIASTOLIC BLOOD PRESSURE: 68 MMHG

## 2024-11-27 DIAGNOSIS — R32 URINARY INCONTINENCE, UNSPECIFIED TYPE: ICD-10-CM

## 2024-11-27 DIAGNOSIS — N39.45 CONTINUOUS LEAKAGE OF URINE: ICD-10-CM

## 2024-11-27 DIAGNOSIS — N32.81 OVERACTIVE BLADDER: Primary | ICD-10-CM

## 2024-11-27 PROCEDURE — 99024 POSTOP FOLLOW-UP VISIT: CPT

## 2024-11-27 NOTE — PROGRESS NOTES
11/27/2024    No chief complaint on file.      Assessment and Plan    84 y.o. female manage by AP Team and Dr. Mendiola    Urinary incontinence   Status post injection of Botox 100 international units by Dr. Mendiola on 10/31/2024  Patient is doing well and she is down to 3 pads per day on average as opposed to 6-10.  She is emptying well with PVR of 25 mL today.  I did encourage her to try Detrol 2 mg twice daily as prescribed by Dr. Mendiola.  She will follow-up in 4 months to reassess her symptoms and determine need for repeat Botox.      Interval HPI:    Currently going through 3 pads per day previously upwards of 6-10 per day. Doing well otherwise, denies any gross hematuria. Emptying well with PVR of 25 mL. She has not been taking Detrol as prescribed.  She is no longer on chronic O2        History of Present Illness  Lisa Wolfe is a 84 y.o. female here for follow-up evaluation of s/p injection of Botox.     Established patient with history of overactive bladder with urge incontinence.  She was initially seen by me in consultation in May 2023 and had previously been following with Dr. Gamez with [facility].  She has failed multiple oral therapies in the past including Sanctura, Ditropan, Myrbetriq, as well is Gemtesa.  She had been undergoing Botox injections which have been working for her.  I recommended cystoscopy evaluation with Dr. Mendiola which was completed on 5/17/2023.  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  Dr. Mendiola recommended that if her symptoms persisted after antibiotic treatment we could schedule her for Botox upon her request.     She did receive Botox 100 international unit by Dr. Mendiola in July 2023 but was lost to follow-up afterwards. She was seen by [facility] in March 2024 and eventually seen by me again in August 2024 to discuss repeat Botox injections. Since I had last seen her, she had an  acute hospitalization at [facility] in May 2024 for  acute respiratory failure with hypoxia as well as CHF exacerbation, she is now on chronic 2 L nasal cannula. She was cleared by Cardiology and Pulmonology and received Botox 100 international units by Dr. Mendiola on 10/31/2024.             Review of Systems   Constitutional:  Negative for chills and fever.   HENT:  Negative for ear pain and sore throat.    Eyes:  Negative for pain and visual disturbance.   Respiratory:  Negative for cough and shortness of breath.    Cardiovascular:  Negative for chest pain and palpitations.   Gastrointestinal:  Negative for abdominal pain and vomiting.   Genitourinary:  Negative for difficulty urinating, dysuria, flank pain, frequency, hematuria and urgency.   Musculoskeletal:  Negative for arthralgias and back pain.   Skin:  Negative for color change and rash.   Allergic/Immunologic: Negative for immunocompromised state.   Neurological:  Negative for syncope and weakness.   All other systems reviewed and are negative.              Vitals  There were no vitals filed for this visit.    Physical Exam  Vitals reviewed.   Constitutional:       General: She is not in acute distress.     Appearance: Normal appearance. She is normal weight. She is not ill-appearing or toxic-appearing.   HENT:      Head: Normocephalic and atraumatic.      Nose: Nose normal.   Eyes:      General: No scleral icterus.     Conjunctiva/sclera: Conjunctivae normal.   Cardiovascular:      Rate and Rhythm: Normal rate.      Pulses: Normal pulses.   Pulmonary:      Effort: Pulmonary effort is normal. No respiratory distress.   Abdominal:      General: Abdomen is flat.      Palpations: Abdomen is soft.      Tenderness: There is no abdominal tenderness. There is no right CVA tenderness or left CVA tenderness.      Hernia: No hernia is present.   Musculoskeletal:         General: Normal range of motion.      Cervical back: Normal range of motion.   Skin:     General: Skin is warm and dry.   Neurological:      General:  No focal deficit present.      Mental Status: She is alert and oriented to person, place, and time. Mental status is at baseline.   Psychiatric:         Mood and Affect: Mood normal.         Behavior: Behavior normal.         Thought Content: Thought content normal.         Judgment: Judgment normal.         Past History  Past Medical History:   Diagnosis Date    Anxiety     Breast CA (HCC)     left    Cancer (HCC)     kidney    Colon cancer (HCC)     GERD (gastroesophageal reflux disease)     History of transfusion     Hypertension     Walker as ambulation aid      Social History     Socioeconomic History    Marital status: /Civil Union     Spouse name: Not on file    Number of children: Not on file    Years of education: Not on file    Highest education level: Not on file   Occupational History    Not on file   Tobacco Use    Smoking status: Never    Smokeless tobacco: Never   Vaping Use    Vaping status: Never Used   Substance and Sexual Activity    Alcohol use: Yes     Comment: rarely    Drug use: Never    Sexual activity: Not Currently   Other Topics Concern    Not on file   Social History Narrative    Not on file     Social Drivers of Health     Financial Resource Strain: Low Risk  (9/6/2024)    Received from [facility]    Overall Financial Resource Strain (CARDIA)     Difficulty of Paying Living Expenses: Not very hard   Food Insecurity: No Food Insecurity (9/6/2024)    Received from [facility]    Hunger Vital Sign     Worried About Running Out of Food in the Last Year: Never true     Ran Out of Food in the Last Year: Never true   Transportation Needs: No Transportation Needs (9/6/2024)    Received from [facility]    PRAPARE - Transportation     Lack of Transportation (Medical): No     Lack of Transportation (Non-Medical): No   Physical Activity: Not on file   Stress: Not on file   Social Connections: Not on file   Intimate Partner Violence: Not At  "Risk (9/6/2024)    Received from [facility]    Humiliation, Afraid, Rape, and Kick questionnaire     Fear of Current or Ex-Partner: No     Emotionally Abused: No     Physically Abused: No     Sexually Abused: No   Housing Stability: Low Risk  (9/6/2024)    Received from [facility]    Housing Stability Vital Sign     Unable to Pay for Housing in the Last Year: No     Number of Times Moved in the Last Year: 1     Homeless in the Last Year: No     Social History     Tobacco Use   Smoking Status Never   Smokeless Tobacco Never     No family history on file.    The following portions of the patient's history were reviewed and updated as appropriate allergies, current medications, past medical history, past social history, past surgical history and problem list    Imaging:    Results  No results found for this or any previous visit (from the past hour).]  No results found for: \"PSA\"  Lab Results   Component Value Date    CALCIUM 9.2 10/24/2024    K 4.4 10/24/2024    CO2 31 10/24/2024     10/24/2024    BUN 15 10/24/2024    CREATININE 0.64 10/24/2024     Lab Results   Component Value Date    WBC 6.56 10/24/2024    HGB 13.4 10/24/2024    HCT 42.6 10/24/2024     (H) 10/24/2024     10/24/2024       Please Note:  Voice dictation software has been used to create this document. There may be inadvertent transcriptions errors.     BRITTANY العلي 11/27/24   "

## 2025-01-21 ENCOUNTER — TELEPHONE (OUTPATIENT)
Age: 85
End: 2025-01-21

## 2025-01-21 NOTE — TELEPHONE ENCOUNTER
Called pt due to appt needing to be r/s due to change in provider's schedule. Pt  Stefan stated the botox is not helping at all and would like a call regarding another treatment plan and will r/s appt at the next phone call     Pt call [phone] Stefan ()

## 2025-01-22 NOTE — TELEPHONE ENCOUNTER
Called and spoke with patients spouse Stefan. He reports the effects from Botox seem to be wearing off and patient continues with urinary incontinence. Discuss BRITTANY Upton's last note with patients spouse. He does not believe that patient is currently taking Detrol twice a day as prescribed by Dr. Mendiola, as he checked her medications and he does not have this one. Advised that patient should trial Detrol, as she has failed multiple other oral therapies. Patients appointment was re-scheduled with BRITTANY Upton and advised spouse to  medication at the pharmacy and have patient take this as ordered, then will plan to follow up to see if medication is working or if patient will require repeat Botox.     Spouse will return call to the office if there are any issues picking up the script for Detrol, as it was sent to the pharmacy back in October.

## 2025-03-13 ENCOUNTER — TELEPHONE (OUTPATIENT)
Dept: UROLOGY | Facility: CLINIC | Age: 85
End: 2025-03-13

## 2025-03-13 ENCOUNTER — OFFICE VISIT (OUTPATIENT)
Dept: UROLOGY | Facility: CLINIC | Age: 85
End: 2025-03-13
Payer: MEDICARE

## 2025-03-13 VITALS
HEIGHT: 62 IN | BODY MASS INDEX: 33.86 KG/M2 | HEART RATE: 68 BPM | SYSTOLIC BLOOD PRESSURE: 142 MMHG | OXYGEN SATURATION: 96 % | WEIGHT: 184 LBS | TEMPERATURE: 97.6 F | DIASTOLIC BLOOD PRESSURE: 80 MMHG

## 2025-03-13 DIAGNOSIS — N39.45 CONTINUOUS LEAKAGE OF URINE: ICD-10-CM

## 2025-03-13 DIAGNOSIS — R32 URINARY INCONTINENCE, UNSPECIFIED TYPE: ICD-10-CM

## 2025-03-13 DIAGNOSIS — N32.81 OVERACTIVE BLADDER: Primary | ICD-10-CM

## 2025-03-13 PROCEDURE — 99213 OFFICE O/P EST LOW 20 MIN: CPT

## 2025-03-13 NOTE — PROGRESS NOTES
Name: Lisa Wolfe      : 1940      MRN: [id number]  Encounter Provider: BRITTANY العلي  Encounter Date: 3/13/2025   Encounter department: [facility]    :  Assessment & Plan  Overactive bladder  Overactive bladder with severe urge incontinence and continuous leakage of urine.  This is been refractory to multiple oral therapies as well as Botox.  Her symptoms are stable with Detrol 2 mg twice daily and she last received Botox in 2024.  She is rather frustrated by her symptoms and unfortunately explained to her that sometimes it is safer to simply live with our symptoms then risk adverse effects from other treatment options.  We did discuss additional options to consider InterStim however I do not think she would be able to manage device effectively.  We also discussed PTNS which I also do not believe will provide much benefit to her however this is minimally invasive.  After lengthy discussion with both the patient and her  today she has elected to trial PTNS.  We will schedule her for this in the near future.  She will continue with Detrol as prescribed.    Orders:    Posterior Tibial Nerve Stimulation; Future          Interval HPI:    Patient presents today with her  for follow-up evaluation of her overactive bladder and urinary incontinence.  Symptoms are somewhat stable with Detrol 2 mg twice daily but she still gets up 2 times per night and reports having to change her pads at least 2 times overnight and several times per day.  She is very frustrated because Botox has not seem to be helping her.  She also does not like      History of Present Illness     Established patient with history of overactive bladder with urge incontinence.  She was initially seen by me in consultation in May 2023 and had previously been following with Dr. Gamez with [facility].  She has failed multiple oral therapies in the past including Sanctura, Ditropan,  Myrbetriq, as well is Gemtesa.  She had been undergoing Botox injections which have been working for her.  I recommended cystoscopy evaluation with Dr. Mendiola which was completed on 5/17/2023.  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  Dr. Mendiola recommended that if her symptoms persisted after antibiotic treatment we could schedule her for Botox upon her request.      She did receive Botox 100 international unit by Dr. Mendiola in July 2023 but was lost to follow-up afterwards. She was seen by [facility] in March 2024 and eventually seen by me again in August 2024 to discuss repeat Botox injections. Since I had last seen her, she had an  acute hospitalization at [facility] in May 2024 for acute respiratory failure with hypoxia as well as CHF exacerbation, she is now on chronic 2 L nasal cannula. She was cleared by Cardiology and Pulmonology and received Botox 100 international units by Dr. Mendiola on 10/31/2024.     She was last seen by me in November 2024 for follow-up evaluation after receiving Botox.  She reported doing well and was going through 3 pads per day previously upwards of 6-10.  She was emptying well with a PVR of 25 mL.  I had recommended she try Detrol 2 mg twice daily as previously prescribed by Dr. Mendiola.  If her symptoms were to progress we could consider repeat Botox.      Objective   There were no vitals taken for this visit.    Review of Systems   Constitutional:  Negative for chills and fever.   HENT:  Negative for ear pain and sore throat.    Eyes:  Negative for pain and visual disturbance.   Respiratory:  Negative for cough and shortness of breath.    Cardiovascular:  Negative for chest pain and palpitations.   Gastrointestinal:  Negative for abdominal pain and vomiting.   Genitourinary:  Positive for frequency and urgency (incontinence). Negative for dysuria and hematuria.   Musculoskeletal:  Negative for arthralgias and back pain.   Skin:  Negative for color  change and rash.   Neurological:  Negative for seizures and syncope.   All other systems reviewed and are negative.      Physical Exam  Vitals and nursing note reviewed.   Constitutional:       General: She is not in acute distress.     Appearance: She is well-developed.   HENT:      Head: Normocephalic and atraumatic.   Eyes:      Conjunctiva/sclera: Conjunctivae normal.   Cardiovascular:      Rate and Rhythm: Normal rate and regular rhythm.      Heart sounds: No murmur heard.  Pulmonary:      Effort: Pulmonary effort is normal. No respiratory distress.      Breath sounds: Normal breath sounds.   Abdominal:      Palpations: Abdomen is soft.      Tenderness: There is no abdominal tenderness.   Musculoskeletal:         General: No swelling.      Cervical back: Neck supple.   Skin:     General: Skin is warm and dry.      Capillary Refill: Capillary refill takes less than 2 seconds.   Neurological:      Mental Status: She is alert.   Psychiatric:         Mood and Affect: Mood normal.           Imaging:          Please Note:  Voice dictation software has been used to create this document. There may be inadvertent transcriptions errors.     BRITTANY العلي 03/13/25

## 2025-03-13 NOTE — TELEPHONE ENCOUNTER
----- Message from BRITTANY Whittington sent at 3/13/2025  1:48 PM EDT -----  Please call patient to schedule PTNS

## 2025-03-14 NOTE — TELEPHONE ENCOUNTER
Called and spoke with patients spouse Stefan to schedule patient for PTNS treatments. Patient was scheduled for 8 weekly treatments with the 8th treatment coordinated with BRITTANY Upton.

## 2025-04-04 ENCOUNTER — PROCEDURE VISIT (OUTPATIENT)
Dept: UROLOGY | Facility: CLINIC | Age: 85
End: 2025-04-04
Payer: MEDICARE

## 2025-04-04 VITALS
SYSTOLIC BLOOD PRESSURE: 134 MMHG | DIASTOLIC BLOOD PRESSURE: 74 MMHG | WEIGHT: 182.6 LBS | OXYGEN SATURATION: 92 % | HEIGHT: 62 IN | HEART RATE: 60 BPM | TEMPERATURE: 97.2 F | BODY MASS INDEX: 33.6 KG/M2 | RESPIRATION RATE: 16 BRPM

## 2025-04-04 DIAGNOSIS — N32.81 OVERACTIVE BLADDER: Primary | ICD-10-CM

## 2025-04-04 DIAGNOSIS — R32 URINARY INCONTINENCE, UNSPECIFIED TYPE: ICD-10-CM

## 2025-04-04 PROCEDURE — 64566 NEUROELTRD STIM POST TIBIAL: CPT

## 2025-04-04 NOTE — PROGRESS NOTES
"4/4/2025    Lisa Wolfe  1940  [id number]    Diagnosis: Urge incontinence, OAB     Chief Complaint    PTNS        Patient presents for PTNS 1 of 12 managed by Dr. Mendiola. Consent and ABN signed.     Plan  Follow up in 1 week for next PTNS.     Urinary Incontinence Screening      Flowsheet Row Most Recent Value   Urinary Incontinence    Urinary Incontinence? Yes   Incomplete emptying? No   Urinary frequency? Yes   Urinary urgency? Yes  [sometimes]   Urinary hesitancy? No   Dysuria (painful difficult urination)? No   Nocturia (waking up to use the bathroom)? Yes   Straining (having to push to go)? No   Weak stream? No   Intermittent stream? No   Post void dribbling? No   Vaginal pressure? No   Vaginal dryness? No          Vitals:    04/04/25 1058   BP: 134/74   Pulse: 60   Resp: 16   Temp: (!) 97.2 °F (36.2 °C)   SpO2: 92%   Weight: 82.8 kg (182 lb 9.6 oz)   Height: 5' 2\" (1.575 m)         Procedure PTNS    History:  Date onset of symptoms: about 1-2 years ago  Drugs: Patient failed Sanctura, Ditropan, Myrbetriq, and Gemtesa  Other: Patient had a few Botox injections which were not effective    Session 1 of 12    Ankle used: left  Treatment setting:10  Duration of treatment: 30 minutes  Lead lot #: [id number]  Expiration Date: 9-  Feeling/Response: left leg     Patient Goals   Decrease Urgency Yes  Decrease Frequency Yes  Episodes of incontinence Yes  Sleep Through Night No  Related Health and Social Factors No    Bladder Diary Summary:  Caffeine cups per day: 2 cups of coffee in the morning, occasional soda or iced tea   Alcohol- number of drinks per day: 0  Daytime voids: every hour and a half   Nighttime voids: 1  Urgency: fairly high   Incontinence- episodes per day: Patient wears a pad and changes the pad 2-3 times per day and 1-2 times per night. Patient is normally incontinent of urine when she wakes up in the morning     Treatment Plan  Increase Fluids Yes-water   Decrease Caffeine " No  Decrease FluidsNo  Urge reduction techniques No  Kegels No- these were explained to patient and she will try to start these at home   Timed voiding No- This was explained and patient will try this   No fluids before bed No- advised patient to cut off fluids 1 hour prior to bed           Nahed Caldwell RN

## 2025-04-10 ENCOUNTER — PROCEDURE VISIT (OUTPATIENT)
Dept: UROLOGY | Facility: CLINIC | Age: 85
End: 2025-04-10
Payer: MEDICARE

## 2025-04-10 VITALS
SYSTOLIC BLOOD PRESSURE: 136 MMHG | DIASTOLIC BLOOD PRESSURE: 70 MMHG | OXYGEN SATURATION: 92 % | RESPIRATION RATE: 16 BRPM | WEIGHT: 183 LBS | BODY MASS INDEX: 33.68 KG/M2 | HEART RATE: 60 BPM | TEMPERATURE: 97.1 F | HEIGHT: 62 IN

## 2025-04-10 DIAGNOSIS — R32 URINARY INCONTINENCE, UNSPECIFIED TYPE: ICD-10-CM

## 2025-04-10 DIAGNOSIS — N32.81 OVERACTIVE BLADDER: Primary | ICD-10-CM

## 2025-04-10 PROCEDURE — 64566 NEUROELTRD STIM POST TIBIAL: CPT

## 2025-04-10 NOTE — PROGRESS NOTES
I supervised the Advanced Practitioner on . I reviewed the Advanced Practitioner note and agree.    Drew Mendiola MD 04/10/25

## 2025-04-10 NOTE — PROGRESS NOTES
"4/10/2025    Lisa Wolfe  1940  [id number]    Diagnosis: Urge incontinence, OAB    Chief Complaint    PTNS        Patient presents for PTNS 2 of 12 managed by Dr. Mendiola.     Plan  Follow up in 1 week for next PTNS     Urinary Incontinence Screening      Flowsheet Row Most Recent Value   Urinary Incontinence    Urinary Incontinence? No   Incomplete emptying? No   Urinary frequency? Yes   Urinary urgency? Yes   Urinary hesitancy? No   Dysuria (painful difficult urination)? No   Nocturia (waking up to use the bathroom)? Yes   Straining (having to push to go)? No   Weak stream? No   Intermittent stream? Yes   Post void dribbling? No   Vaginal pressure? No   Vaginal dryness? No          Vitals:    04/10/25 1020   BP: 136/70   Pulse: 60   Resp: 16   Temp: (!) 97.1 °F (36.2 °C)   SpO2: 92%   Weight: 83 kg (183 lb)   Height: 5' 2\" (1.575 m)         Procedure PTNS    Session 2 of 12    Ankle used: right  Treatment settin  Duration of treatment: 30 minutes  Lead lot #: [id number]  Expiration Date: 9-  Feeling/Response: right leg     Patient Progress  Decreased Urgency No  Decreased Frequency No  Episodes of incontinence Yes-no improvement yet   Sleep Through Night No  Related Health and Social Factors Yes    Bladder Diary Summary:  Caffeine cups per day: 1-2 cups of coffee per day, occasional soda   Alcohol- number of drinks per day: 0  Daytime voids: every hour  Nighttime voids: 1-2  Urgency: fairly high   Incontinence- episodes per day:patient wears a pad and changes it 2-3 times per day and once or twice overnight. She reports she typically wakes up incontinent in the morning     Treatment Plan  Increase Fluids Yes  Decrease Caffeine No  Decrease FluidsNo  Urge reduction techniques No  Kegels No- explained this again and patient will try to start   Timed voiding No  No fluids before bed No- advised to start cutting off fluids 1 hour prior to bedtime           Nahed Caldwell RN   "

## 2025-04-18 ENCOUNTER — PROCEDURE VISIT (OUTPATIENT)
Dept: UROLOGY | Facility: CLINIC | Age: 85
End: 2025-04-18
Payer: MEDICARE

## 2025-04-18 ENCOUNTER — TELEPHONE (OUTPATIENT)
Age: 85
End: 2025-04-18

## 2025-04-18 VITALS
WEIGHT: 184.4 LBS | BODY MASS INDEX: 33.93 KG/M2 | OXYGEN SATURATION: 91 % | HEART RATE: 64 BPM | DIASTOLIC BLOOD PRESSURE: 70 MMHG | TEMPERATURE: 97.2 F | HEIGHT: 62 IN | SYSTOLIC BLOOD PRESSURE: 138 MMHG | RESPIRATION RATE: 18 BRPM

## 2025-04-18 DIAGNOSIS — R32 URINARY INCONTINENCE, UNSPECIFIED TYPE: ICD-10-CM

## 2025-04-18 DIAGNOSIS — N32.81 OVERACTIVE BLADDER: Primary | ICD-10-CM

## 2025-04-18 PROCEDURE — 64566 NEUROELTRD STIM POST TIBIAL: CPT

## 2025-04-18 NOTE — TELEPHONE ENCOUNTER
Patient's  called to schedule new patient appointment for memory issues and dementia and intake directed them to [facility].

## 2025-04-18 NOTE — PROGRESS NOTES
"2025    Lisa Wolfe  1940  [id number]    Diagnosis: Urinary incontinence, OAB    Chief Complaint    PTNS        Patient presents for PTNS 3 of 12 managed by Dr. Mendiola.     Plan  Follow up in 1 week for weekly PTNS     Urinary Incontinence Screening      Flowsheet Row Most Recent Value   Urinary Incontinence    Urinary Incontinence? Yes   Incomplete emptying? No   Urinary frequency? Yes   Urinary urgency? Yes   Urinary hesitancy? No   Dysuria (painful difficult urination)? No   Nocturia (waking up to use the bathroom)? No   Straining (having to push to go)? No   Weak stream? No   Intermittent stream? No   Post void dribbling? No   Vaginal pressure? No   Vaginal dryness? No          Vitals:    25 1046   BP: 138/70   Pulse: 64   Resp: 18   Temp: (!) 97.2 °F (36.2 °C)   SpO2: 91%   Weight: 83.6 kg (184 lb 6.4 oz)   Height: 5' 2\" (1.575 m)         Procedure PTNS    Session 3 of 12    Ankle used: right  Treatment settin  Duration of treatment: 30 minutes  Lead lot #: [id number]  Expiration Date: 9-  Feeling/Response: right leg     Patient Progress  Decreased Urgency No  Decreased Frequency No  Episodes of incontinence No- no change   Sleep Through Night - usually wakes up once overnight to urinate  Related Health and Social Factors Yes-Lasix     Bladder Diary Summary:  Caffeine cups per day: 1-2 cups of coffee per day  Alcohol- number of drinks per day: 0  Daytime voids: every 1-2 hours   Nighttime voids: 1- she gets up and changes her pad typically once or twice a night  Urgency: pretty high  Incontinence- episodes per day: Patient wears a pad all the time and changes it 2-3 times per day and 1-2 times overnight     Treatment Plan  Increase Fluids Yes  Decrease Caffeine No  Decrease FluidsNo  Urge reduction techniques No  Kegels Yes- she tried this a few times   Timed voiding No  No fluids before bed Yes          Nahed Caldwell RN   "

## 2025-04-25 ENCOUNTER — PROCEDURE VISIT (OUTPATIENT)
Dept: UROLOGY | Facility: CLINIC | Age: 85
End: 2025-04-25
Payer: MEDICARE

## 2025-04-25 VITALS
DIASTOLIC BLOOD PRESSURE: 64 MMHG | TEMPERATURE: 97.5 F | HEART RATE: 66 BPM | RESPIRATION RATE: 16 BRPM | OXYGEN SATURATION: 91 % | HEIGHT: 62 IN | BODY MASS INDEX: 33.57 KG/M2 | SYSTOLIC BLOOD PRESSURE: 122 MMHG | WEIGHT: 182.4 LBS

## 2025-04-25 DIAGNOSIS — R32 URINARY INCONTINENCE, UNSPECIFIED TYPE: ICD-10-CM

## 2025-04-25 DIAGNOSIS — N32.81 OVERACTIVE BLADDER: Primary | ICD-10-CM

## 2025-04-25 PROCEDURE — 64566 NEUROELTRD STIM POST TIBIAL: CPT

## 2025-05-02 ENCOUNTER — PROCEDURE VISIT (OUTPATIENT)
Dept: UROLOGY | Facility: CLINIC | Age: 85
End: 2025-05-02
Payer: MEDICARE

## 2025-05-02 VITALS
SYSTOLIC BLOOD PRESSURE: 128 MMHG | HEIGHT: 62 IN | TEMPERATURE: 97.3 F | BODY MASS INDEX: 33.93 KG/M2 | HEART RATE: 60 BPM | WEIGHT: 184.4 LBS | OXYGEN SATURATION: 91 % | RESPIRATION RATE: 16 BRPM | DIASTOLIC BLOOD PRESSURE: 68 MMHG

## 2025-05-02 DIAGNOSIS — N39.45 CONTINUOUS LEAKAGE OF URINE: ICD-10-CM

## 2025-05-02 DIAGNOSIS — N32.81 OVERACTIVE BLADDER: Primary | ICD-10-CM

## 2025-05-02 DIAGNOSIS — R32 URINARY INCONTINENCE, UNSPECIFIED TYPE: ICD-10-CM

## 2025-05-02 PROCEDURE — 64566 NEUROELTRD STIM POST TIBIAL: CPT

## 2025-05-02 NOTE — PROGRESS NOTES
"2025    Lisa Wolfe  1940  [id number]    Diagnosis: Urinary incontinence, OAB    Chief Complaint    PTNS        Patient presents for PTNS  of 12 managed by Dr. Mendiola.     Plan  Follow up in 1 week for weekly PTNS      Urinary Incontinence Screening      Flowsheet Row Most Recent Value   Urinary Incontinence    Urinary Incontinence? Yes   Incomplete emptying? No   Urinary frequency? Yes   Urinary urgency? Yes   Urinary hesitancy? No   Dysuria (painful difficult urination)? No   Nocturia (waking up to use the bathroom)? No   Straining (having to push to go)? No   Weak stream? No   Intermittent stream? No   Post void dribbling? Yes  [sometimes]   Vaginal pressure? No   Vaginal dryness? No          Vitals:    25 1041   BP: 128/68   Pulse: 60   Resp: 16   Temp: (!) 97.3 °F (36.3 °C)   SpO2: 91%   Weight: 83.6 kg (184 lb 6.4 oz)   Height: 5' 2\" (1.575 m)         Procedure PTNS    Session 5 of     Ankle used: right  Treatment settin  Duration of treatment: 30 minutes  Lead lot #: [id number]  Expiration Date: 2027   Feeling/Response: right foot     Patient Progress  Decreased Urgency Yes- a little improvement   Decreased Frequency No- no improvement   Episodes of incontinence Yes- improved   Sleep Through Night Yes-normally- sometimes she will get up once to urinate   Related Health and Social Factors Yes-Lasix     Bladder Diary Summary:  Caffeine cups per day: 1.5 cups of coffee per day, occasional tea or soda   Alcohol- number of drinks per day: none  Daytime voids: 6  Nighttime voids: normally sleeps through the night, sometimes gets up once   Urgency: pretty strong   Incontinence- episodes per day: Patient wears a #5 Poise Pad inside her underwear and states she has to change this 2-3 times per day    Treatment Plan  Increase Fluids Yes  Decrease Caffeine No  Decrease FluidsNo  Urge reduction techniques No  Kegels Yes-sometimes   Timed voiding No  No fluids before bed " Yes          Nahed Caldwell RN

## 2025-05-08 ENCOUNTER — PROCEDURE VISIT (OUTPATIENT)
Dept: UROLOGY | Facility: CLINIC | Age: 85
End: 2025-05-08
Payer: MEDICARE

## 2025-05-08 VITALS
SYSTOLIC BLOOD PRESSURE: 136 MMHG | BODY MASS INDEX: 33.34 KG/M2 | WEIGHT: 181.2 LBS | TEMPERATURE: 97.7 F | OXYGEN SATURATION: 91 % | HEART RATE: 58 BPM | DIASTOLIC BLOOD PRESSURE: 72 MMHG | HEIGHT: 62 IN | RESPIRATION RATE: 16 BRPM

## 2025-05-08 DIAGNOSIS — N39.45 CONTINUOUS LEAKAGE OF URINE: ICD-10-CM

## 2025-05-08 DIAGNOSIS — R32 URINARY INCONTINENCE, UNSPECIFIED TYPE: ICD-10-CM

## 2025-05-08 DIAGNOSIS — N32.81 OVERACTIVE BLADDER: Primary | ICD-10-CM

## 2025-05-08 PROCEDURE — 64566 NEUROELTRD STIM POST TIBIAL: CPT

## 2025-05-08 NOTE — PROGRESS NOTES
"2025    Lisa Wolfe  1940  [id number]    Diagnosis: OAB, Urinary incontinence     Chief Complaint    PTNS        Patient presents for PTNS  managed by Dr. Mendiola.     Plan   Follow up in 1 week for next weekly PTNS     Urinary Incontinence Screening      Flowsheet Row Most Recent Value   Urinary Incontinence    Urinary Incontinence? Yes   Incomplete emptying? No   Urinary frequency? No   Urinary urgency? Yes   Urinary hesitancy? No   Dysuria (painful difficult urination)? No   Nocturia (waking up to use the bathroom)? Yes   Straining (having to push to go)? No   Weak stream? No   Intermittent stream? No   Post void dribbling? No   Vaginal pressure? No   Vaginal dryness? No          Vitals:    25 1044   BP: 136/72   Pulse: 58   Resp: 16   Temp: 97.7 °F (36.5 °C)   SpO2: 91%   Weight: 82.2 kg (181 lb 3.2 oz)   Height: 5' 2\" (1.575 m)         Procedure PTNS      Session 6     Ankle used: right  Treatment settin  Duration of treatment: 30 minutes  Lead lot #: [id number]  Expiration Date: 2027  Feeling/Response: right foot     Patient Progress  Decreased Urgency Yes- a little improvement but \"not much\"   Decreased Frequency Yes  Episodes of incontinence Yes-improved during the day but not at night   Sleep Through Night No  Related Health and Social Factors No    Bladder Diary Summary:  Caffeine cups per day: a cup and a half of coffee per day, occasional soda or iced tea   Alcohol- number of drinks per day: 0  Daytime voids: 6  Nighttime voids: 1-2  Urgency: high   Incontinence- episodes per day: Patient wears a pad and has to change this 2-3 times during the day and another 1-2 times overnight     Treatment Plan  Increase Fluids Yes  Decrease Caffeine No  Decrease FluidsNo  Urge reduction techniques No  Kegels Yes-sometimes   Timed voiding No  No fluids before bed Yes          Nahed Caldwell RN   "

## 2025-05-08 NOTE — PROGRESS NOTES
I supervised the Advanced Practitioner on . I reviewed the Advanced Practitioner note and agree.    Drew Mendiola MD 05/08/25

## 2025-05-16 ENCOUNTER — TELEPHONE (OUTPATIENT)
Age: 85
End: 2025-05-16
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
## 2025-05-22 ENCOUNTER — OFFICE VISIT (OUTPATIENT)
Dept: UROLOGY | Facility: CLINIC | Age: 85
End: 2025-05-22
Payer: MEDICARE

## 2025-05-22 VITALS
DIASTOLIC BLOOD PRESSURE: 82 MMHG | HEIGHT: 62 IN | HEART RATE: 61 BPM | RESPIRATION RATE: 16 BRPM | BODY MASS INDEX: 33.75 KG/M2 | TEMPERATURE: 97.2 F | OXYGEN SATURATION: 92 % | WEIGHT: 183.4 LBS | SYSTOLIC BLOOD PRESSURE: 140 MMHG

## 2025-05-22 DIAGNOSIS — R32 URINARY INCONTINENCE, UNSPECIFIED TYPE: ICD-10-CM

## 2025-05-22 DIAGNOSIS — N32.81 OVERACTIVE BLADDER: ICD-10-CM

## 2025-05-22 PROCEDURE — 99213 OFFICE O/P EST LOW 20 MIN: CPT

## 2025-05-22 NOTE — PROGRESS NOTES
"2025    Lisa Wolfe  1940  [id number]    Diagnosis: Urinary incontinence, OAB    Chief Complaint    OAB, Incontinence, PTNS        Patient presents for PTNS  12 managed by Dr. Mendiola.     Urinary Incontinence Screening      Flowsheet Row Most Recent Value   Urinary Incontinence    Urinary Incontinence? Yes   Incomplete emptying? No   Urinary frequency? Yes   Urinary urgency? Yes   Urinary hesitancy? No   Dysuria (painful difficult urination)? No   Nocturia (waking up to use the bathroom)? Yes   Straining (having to push to go)? No   Weak stream? No   Intermittent stream? No   Post void dribbling? No   Vaginal pressure? No   Vaginal dryness? No          Vitals:    25 1243   BP: 140/82   Pulse: 61   Resp: 16   Temp: (!) 97.2 °F (36.2 °C)   SpO2: 92%   Weight: 83.2 kg (183 lb 6.4 oz)   Height: 5' 2\" (1.575 m)         Procedure PTNS    Session 7     Ankle used: right  Treatment settin  Duration of treatment: 30 minutes  Lead lot #: [id number]  Expiration Date: 2027  Feeling/Response:right leg     Patient Progress  Decreased Urgency Yes  Decreased Frequency Yes  Episodes of incontinence Yes-daytime improvement   Sleep Through Night No  Related Health and Social Factors Yes    Bladder Diary Summary:  Caffeine cups per day: 1.5 cups of coffee per day, 1 glass of soda or iced tea per day   Alcohol- number of drinks per day: 0  Daytime voids:6-7  Nighttime voids: 1-2  Urgency: high, but improved during the day   Incontinence- episodes per day: she wears a pad and changes this typically 2-3 times per day and once or twice overnight     Treatment Plan  Increase Fluids Yes  Decrease Caffeine No  Decrease FluidsNo  Urge reduction techniques No  Kegels Yes-occasionally   Timed voiding No  No fluids before bed Yes          Nahed Caldwell RN   "

## 2025-05-22 NOTE — PROGRESS NOTES
Name: Lisa Wolfe      : 1940      MRN: [id number]  Encounter Provider: BRITTANY العلي  Encounter Date: 2025   Encounter department: [facility] [place]    :  Assessment & Plan  Overactive bladder  Patient will continue with weekly PTNS sessions and will progress to monthly therapy after 12 weeks.  She will follow-up with me in 3 to 4 months to reassess her response after transitioning to monthly sessions.  She will continue with Detrol.    Orders:    Posterior Tibial Nerve Stimulation    Urinary incontinence, unspecified type  As per plan above  Orders:    Posterior Tibial Nerve Stimulation          Interval HPI:    She does report improvement in baseline symptoms since starting PTNS.  During the daytime she is doing notably better with reduced frequency and urgency.  Nighttime she still wakes up wet quite often.  Last night she only got up 1 time per night but typically she is getting up twice.  Overall she is satisfied with results.    History of Present Illness     Patient presents today for follow-up evaluation to assess response to PTNS.  Patient currently receiving weekly session 6 of 12 managed by myself and Dr. Mendiola.    She has a history of Overactive bladder with severe urge incontinence and continuous leakage of urine.  This is been refractory to multiple oral therapies as well as Botox.  Her symptoms are stable with Detrol 2 mg twice daily and she last received Botox in 2024. She is rather frustrated by her symptoms and unfortunately explained to her that sometimes it is safer to simply live with our symptoms then risk adverse effects from other treatment options.  At her last office visit with me in 2025 I did discuss options to consider PTNS.  Her  was willing to bring her to the weekly visits and she was ultimately agreeable.  She was also instructed to continue with Detrol 2 mg daily.          Objective   There were no vitals taken for  this visit.    Review of Systems   Constitutional:  Negative for chills and fever.   HENT:  Negative for ear pain and sore throat.    Eyes:  Negative for pain and visual disturbance.   Respiratory:  Negative for cough and shortness of breath.    Cardiovascular:  Negative for chest pain and palpitations.   Gastrointestinal:  Negative for abdominal pain and vomiting.   Genitourinary:  Positive for frequency and urgency. Negative for dysuria and hematuria.   Musculoskeletal:  Negative for arthralgias and back pain.   Skin:  Negative for color change and rash.   Neurological:  Negative for seizures and syncope.   All other systems reviewed and are negative.      Physical Exam  Vitals and nursing note reviewed.   Constitutional:       General: She is not in acute distress.     Appearance: She is well-developed.   HENT:      Head: Normocephalic and atraumatic.     Eyes:      Conjunctiva/sclera: Conjunctivae normal.       Cardiovascular:      Rate and Rhythm: Normal rate and regular rhythm.      Heart sounds: No murmur heard.  Pulmonary:      Effort: Pulmonary effort is normal. No respiratory distress.      Breath sounds: Normal breath sounds.   Abdominal:      Palpations: Abdomen is soft.      Tenderness: There is no abdominal tenderness.     Musculoskeletal:         General: No swelling.      Cervical back: Neck supple.     Skin:     General: Skin is warm and dry.      Capillary Refill: Capillary refill takes less than 2 seconds.     Neurological:      Mental Status: She is alert.     Psychiatric:         Mood and Affect: Mood normal.           Imaging:          Please Note:  Voice dictation software has been used to create this document. There may be inadvertent transcriptions errors.     BRITTANY العلي 05/22/25

## 2025-05-30 ENCOUNTER — PROCEDURE VISIT (OUTPATIENT)
Dept: UROLOGY | Facility: CLINIC | Age: 85
End: 2025-05-30
Payer: MEDICARE

## 2025-05-30 VITALS
OXYGEN SATURATION: 92 % | BODY MASS INDEX: 33.79 KG/M2 | TEMPERATURE: 97.7 F | HEART RATE: 67 BPM | DIASTOLIC BLOOD PRESSURE: 70 MMHG | HEIGHT: 62 IN | RESPIRATION RATE: 16 BRPM | WEIGHT: 183.6 LBS | SYSTOLIC BLOOD PRESSURE: 136 MMHG

## 2025-05-30 DIAGNOSIS — N32.81 OVERACTIVE BLADDER: ICD-10-CM

## 2025-05-30 DIAGNOSIS — R32 URINARY INCONTINENCE, UNSPECIFIED TYPE: ICD-10-CM

## 2025-05-30 PROCEDURE — 64566 NEUROELTRD STIM POST TIBIAL: CPT

## 2025-05-30 NOTE — PROGRESS NOTES
"2025    Lisa Wolfe  1940  [id number]    Diagnosis: Urinary incontinence, OAB    Chief Complaint    PTNS        Patient presents for PTNS  managed by Dr. Mendiola.     Plan  Follow up in 1 week for next weekly PTNS      Urinary Incontinence Screening      Flowsheet Row Most Recent Value   Urinary Incontinence    Urinary Incontinence? Yes   Incomplete emptying? No   Urinary frequency? Yes   Urinary urgency? Yes   Urinary hesitancy? No   Dysuria (painful difficult urination)? No   Nocturia (waking up to use the bathroom)? Yes   Straining (having to push to go)? No   Weak stream? No   Intermittent stream? No   Post void dribbling? Yes   Vaginal pressure? No   Vaginal dryness? No          Vitals:    25 1021   BP: 136/70   Pulse: 67   Resp: 16   Temp: 97.7 °F (36.5 °C)   SpO2: 92%   Weight: 83.3 kg (183 lb 9.6 oz)   Height: 5' 2\" (1.575 m)         Procedure PTNS      Session 8     Ankle used: right  Treatment settin  Duration of treatment: 30 minutes  Lead lot #: [id number]  Expiration Date: 2027  Feeling/Response: right foot     Patient Progress  Decreased Urgency Yes  Decreased Frequency Yes  Episodes of incontinence Yes-improved during the day   Sleep Through Night No  Related Health and Social Factors Yes    Bladder Diary Summary:  Caffeine cups per day: 1 and a half cups of coffee per day, 1 glass of soda or tea daily   Alcohol- number of drinks per day: 0  Daytime voids: 8   Nighttime voids: 1   Urgency: improved during the day   Incontinence- episodes per day: patient wears a pad and changes it once overnight and 2-3 times during the day     Treatment Plan  Increase Fluids Yes  Decrease Caffeine No  Decrease FluidsNo  Urge reduction techniques No  Kegels Yes-sometimes   Timed voiding No  No fluids before bed Yes          Nahed Caldwell RN   "

## 2025-06-05 ENCOUNTER — TELEPHONE (OUTPATIENT)
Age: 85
End: 2025-06-05

## 2025-06-05 NOTE — TELEPHONE ENCOUNTER
Called the patient's  back who states he wishes to cancel the remaining PTNS appointments for his wife.  They both feel the treatments has not helped and do not want to proceed with further treatments.  Remaining appointment cancelled.

## 2025-06-05 NOTE — TELEPHONE ENCOUNTER
Patient's  called in requesting a call back from Nahed, they have a few questions regarding their appointment with her tomorrow.     Pt cb: [phone]

## 2025-06-06 NOTE — TELEPHONE ENCOUNTER
Called and left a voicemail message for patients spouse Stefan. Advised that BRITTANY Upton recommended a 3-4 month follow up with AP at time of last office visit. Asked Stefan to call back if he wishes to schedule routine AP follow up for patient in the next 3-4 months for reassessment.

## (undated) DEVICE — MAT FLOOR STEP DRI 24 X 36 IN N-STRL

## (undated) DEVICE — URO CATCHER BAG STERILE 0-UC32

## (undated) DEVICE — STERILE SURGICAL LUBRICANT,  TUBE: Brand: SURGILUBE

## (undated) DEVICE — BONEE® NEEDLE FOR BLADDER INJECTION CH FR 05, 22G, 35 CM, BOX OF 1: Brand: PORGES COLOPLAST

## (undated) DEVICE — PACK TUR

## (undated) DEVICE — GLOVE SRG BIOGEL 7

## (undated) DEVICE — NEEDLE 18 G X 1 1/2

## (undated) DEVICE — TRANSPOSAL ULTRAFLEX DUO/QUAD ULTRA CART MANIFOLD

## (undated) DEVICE — LUBRICANT JELLY SURGILUBE TUBE 4OZ FLIP TOP

## (undated) DEVICE — TUBING SUCTION 5MM X 12 FT

## (undated) DEVICE — IV FLUSH NSS 10ML STERILE FIELD USE POSIFLUSH

## (undated) DEVICE — SCD SEQUENTIAL COMPRESSION COMFORT SLEEVE MEDIUM KNEE LENGTH: Brand: KENDALL SCD

## (undated) DEVICE — CHLORHEXIDINE 4PCT 4 OZ